# Patient Record
Sex: MALE | Race: WHITE | NOT HISPANIC OR LATINO | Employment: OTHER | URBAN - NONMETROPOLITAN AREA
[De-identification: names, ages, dates, MRNs, and addresses within clinical notes are randomized per-mention and may not be internally consistent; named-entity substitution may affect disease eponyms.]

---

## 2017-01-10 ENCOUNTER — OFFICE VISIT (OUTPATIENT)
Dept: OTOLARYNGOLOGY | Facility: CLINIC | Age: 77
End: 2017-01-10

## 2017-01-10 VITALS
BODY MASS INDEX: 34.67 KG/M2 | WEIGHT: 256 LBS | TEMPERATURE: 97.3 F | HEIGHT: 72 IN | SYSTOLIC BLOOD PRESSURE: 118 MMHG | DIASTOLIC BLOOD PRESSURE: 70 MMHG

## 2017-01-10 DIAGNOSIS — Z85.828 HISTORY OF BASAL CELL CARCINOMA: Primary | ICD-10-CM

## 2017-01-10 PROCEDURE — 99213 OFFICE O/P EST LOW 20 MIN: CPT | Performed by: OTOLARYNGOLOGY

## 2017-01-10 RX ORDER — BUDESONIDE 0.25 MG/2ML
0.25 INHALANT ORAL
COMMUNITY

## 2017-01-10 NOTE — MR AVS SNAPSHOT
Chepe Acevedo   1/10/2017 9:00 AM   Office Visit    Dept Phone:  [phone]   Encounter #:  [id number]    Provider:  Ashok Harden MD   Department:  [facility]                Your Full Care Plan              Today's Medication Changes          These changes are accurate as of: 1/10/17  9:30 AM.  If you have any questions, ask your nurse or doctor.               Medication(s)that have changed:     indapamide 2.5 MG tablet   Commonly known as:  LOZOL   Take 2.5 mg by mouth daily.   What changed:  Another medication with the same name was removed. Continue taking this medication, and follow the directions you see here.   Changed by:  Ashok Harden MD         Stop taking medication(s)listed here:     FLOVENT HFA IN   Stopped by:  Ashok Harden MD           HYDROcodone-acetaminophen 5-325 MG per tablet   Commonly known as:  NORCO   Stopped by:  Ashok Harden MD           Hydrocortisone Butyrate 0.1 % solution   Stopped by:  Ashok Harden MD           Unable to find   Stopped by:  Ashok Harden MD                      Your Updated Medication List          This list is accurate as of: 1/10/17  9:30 AM.  Always use your most recent med list.                ASPIRIN LOW DOSE 81 MG tablet   Generic drug:  aspirin       AZELASTINE HCL NA       budesonide 0.25 MG/2ML nebulizer solution   Commonly known as:  PULMICORT       CINNAMON PO       CO Q-10 PO       indapamide 2.5 MG tablet   Commonly known as:  LOZOL       JANUVIA 100 MG tablet   Generic drug:  SITagliptin       lisinopril 10 MG tablet   Commonly known as:  PRINIVIL,ZESTRIL       metFORMIN 1000 MG tablet   Commonly known as:  GLUCOPHAGE       montelukast 10 MG tablet   Commonly known as:  SINGULAIR       MUCINEX PO       OCUVITE ADULT 50+ PO       OMEGA 3 PO       propranolol 80 MG tablet   Commonly known as:  INDERAL       RESVERATROL PO       SAW PALMETTO PO       simvastatin 40 MG tablet  "  Commonly known as:  ZOCOR       Turmeric 500 MG capsule       VITAMIN D3 PO               Instructions     None    Patient Instructions History      Upcoming Appointments     Visit Type Date Time Department    FOLLOW UP 1/10/2017  9:00 AM [facility]      MyChart Signup     Our records indicate that you have an active [employer] account.    You can view your After Visit Summary by going to [email or web address] and logging in with your [facility] username and password.  If you don't have a [facility] username and password but a parent or guardian has access to your record, the parent or guardian should login with their own [facility] username and password and access your record to view the After Visit Summary.    If you have questions, you can email [email or web address] or call [phone] to talk to our [facility] staff.  Remember, [facility] is NOT to be used for urgent needs.  For medical emergencies, dial 911.               Other Info from Your Visit           Allergies     Chlor-trimeton [Chlorpheniramine] Allergy Other (See Comments)    HYPER, AND EXTREME AGITATION    Latex Allergy Rash    ITCHING, REDNESS      Reason for Visit     Follow-up           Vital Signs     Blood Pressure Temperature Height Weight Body Mass Index Smoking Status    118/70 97.3 °F (36.3 °C) 72\" (182.9 cm) 256 lb (116 kg) 34.72 kg/m2 Never Smoker        "

## 2017-01-10 NOTE — PROGRESS NOTES
YOB: 1940  Location: [facility]  Location Address: [address] [place] [address], [facility], [address] [place] [address]  Location Phone: [phone]    Chief Complaint   Patient presents with   • Follow-up       History of Present Illness  Chepe Acevedo is a 76 y.o. male.  Chepe Acevedo is here for follow up of ENT complaints.  Patient is status post excision of BCCa of left ear and left temple on 16. The pathology results show clear margins. Patient has no complaints today and denies new skin lesions.    Tissue Exam   Order: [id number]   Status:  Final result   Visible to patient:  Yes (MyChart) Dx:  Basal cell carcinoma; Essential hyper...      3mo ago     Clinical Information       Pre-Op Diagnosis: Basal cell carcinoma.       Final Diagnosis   1. Skin, left temple, excision:  Basal cell carcinoma, nodular pattern  Margins of resection are negative for basal cell carcinoma     2. Skin, left ear, excision:  Basal cell carcinoma, superficial spreading and infiltrated patterns  Margins of resection are negative                   Past Medical History   Diagnosis Date   • Arthritis    • Arthritis    • Asthma    • Basal cell carcinoma of skin of face    • Benign hypertrophy of prostate    • Cataract    • COPD (chronic obstructive pulmonary disease)    • COPD (chronic obstructive pulmonary disease)    • Diabetes    • Diabetes mellitus      TYPE 2   • Hypertension    • Hypertension    • Parkinson's disease    • Postural dizziness    • Skin cancer of face    • Sleep apnea in adult        Past Surgical History   Procedure Laterality Date   • Cholecystectomy     • Basal cell carcinoma excision  2014   • Other surgical history       Shoulder   • Sinus surgery     • Other surgical history       Thumb Surgery   • Trigger finger release       LEFT THUMB   • Total shoulder arthroplasty Right    • Cataract extraction w/ intraocular lens  implant, bilateral Bilateral    • Head/neck lesion/cyst excision Left 2016      Procedure: EXCISION LESION left ear and left temple BASAL CELL W/ F/S;  Surgeon: Ashok Harden MD;  Location:  [facility] OR;  Service:          Current Outpatient Prescriptions:   •  aspirin (ASPIRIN LOW DOSE) 81 MG tablet, Take 81 mg by mouth daily. Delayed Release (DR/EC), Disp: , Rfl:   •  AZELASTINE HCL NA, into each nostril. Azelastine, Disp: , Rfl:   •  budesonide (PULMICORT) 0.25 MG/2ML nebulizer solution, Take 0.25 mg by nebulization Daily., Disp: , Rfl:   •  Cholecalciferol (VITAMIN D3 PO), Take 5,000 Int'l Units by mouth daily., Disp: , Rfl:   •  CINNAMON PO, Take 1,000 mg by mouth daily., Disp: , Rfl:   •  Coenzyme Q10 (CO Q-10 PO), Take 400 mg by mouth daily., Disp: , Rfl:   •  GuaiFENesin (MUCINEX PO), Take 600 mg by mouth daily., Disp: , Rfl:   •  indapamide (LOZOL) 2.5 MG tablet, Take 2.5 mg by mouth daily., Disp: , Rfl:   •  JANUVIA 100 MG tablet, Take 100 mg by mouth daily., Disp: , Rfl:   •  lisinopril (PRINIVIL,ZESTRIL) 10 MG tablet, Take 10 mg by mouth daily., Disp: , Rfl:   •  metFORMIN (GLUCOPHAGE) 1000 MG tablet, Take 1,000 mg by mouth 2 (two) times a day with meals., Disp: , Rfl:   •  montelukast (SINGULAIR) 10 MG tablet, Take 10 mg by mouth. Take 1 tablet (10 mg) by oral route once daily in the evening, Disp: , Rfl:   •  Multiple Vitamins-Minerals (OCUVITE ADULT 50+ PO), Take 1 tablet by mouth Daily., Disp: , Rfl:   •  Omega-3 Fatty Acids (OMEGA 3 PO), Take  by mouth. Omega 3 350-400 mg oral capsule, Disp: , Rfl:   •  propranolol (INDERAL) 80 MG tablet, Take 80 mg by mouth 2 (two) times a day., Disp: , Rfl:   •  RESVERATROL PO, Take 100 mg by mouth daily., Disp: , Rfl:   •  Saw Palmetto, Serenoa repens, (SAW PALMETTO PO), Take 450 mg by mouth., Disp: , Rfl:   •  simvastatin (ZOCOR) 40 MG tablet, Take 40 mg by mouth., Disp: , Rfl:   •  Turmeric 500 MG capsule, Take 500 mg by mouth daily., Disp: , Rfl:     Chlor-trimeton [chlorpheniramine] and Latex    Family History   Problem Relation Age  of Onset   • Heart disease Father    • Arthritis Mother    • Cancer Sister        Social History     Social History   • Marital status:      Spouse name: N/A   • Number of children: N/A   • Years of education: N/A     Occupational History   • Not on file.     Social History Main Topics   • Smoking status: Never Smoker   • Smokeless tobacco: Never Used   • Alcohol use Not on file   • Drug use: No   • Sexual activity: Not on file     Other Topics Concern   • Not on file     Social History Narrative       Review of Systems   All other systems reviewed and are negative.      Vitals:    01/10/17 0855   BP: 118/70   Temp: 97.3 °F (36.3 °C)       Objective     Physical Exam  CONSTITUTIONAL: well nourished, alert, oriented, in no acute distress     COMMUNICATION AND VOICE: able to communicate normally, normal voice quality    HEAD: normocephalic, no lesions, atraumatic, no tenderness, no masses     FACE: appearance normal, no lesions, no tenderness, no deformities, facial motion symmetric-incision of the left temple are well-healed    SALIVARY GLANDS: parotid glands with no tenderness, no swelling, no masses, submandibular glands with normal size, nontender    EYES: ocular motility normal, eyelids normal, orbits normal, no proptosis, conjunctiva normal , pupils equal, round     EARS:  Hearing: response to conversational voice normal bilaterally   External Ears: auricles without lesions-incision the left ear well-healed  Otoscopic: tympanic membrane appearance normal, no lesions, no perforation, normal mobility, no fluid    NOSE:  External Nose: structure normal, no tenderness on palpation, no nasal discharge, no lesions, no evidence of trauma, nostrils patent   Intranasal Exam: nasal mucosa minimal crusting, vestibule within normal limits, inferior turbinate mild bilateral hypertrophy, nasal septum midline   Nasopharynx:     ORAL:  Lips: upper and lower lips without lesion   Teeth: dentition within normal limits for  age   Gums: gingivae healthy   Oral Mucosa: oral mucosa normal, no mucosal lesions   Floor of Mouth: Warthin’s duct patent, mucosa normal  Tongue: lingual mucosa normal without lesions, normal tongue mobility   Palate: soft and hard palates with normal mucosa and structure  Oropharynx: oropharyngeal mucosa normal    HYPOPHARYNX:   LARYNX: epiglottis and arytenoid cartilage within normal limits, vocal cord mucosa normal with normal mobility     NECK: neck appearance normal, no mass,  noted without erythema or tenderness    THYROID: no overt thyromegaly, no tenderness, nodules or mass present on palpation, position midline     LYMPH NODES: no lymphadenopathy    CHEST/RESPIRATORY: respiratory effort normal, normal breath sounds     CARDIOVASCULAR: rate and rhythm normal, extremities without cyanosis or edema      NEUROLOGIC/PSYCHIATRIC: oriented to time, place and person, mood normal, affect appropriate, CN II-XII intact grossly    Assessment/Plan   Chepe was seen today for follow-up.    Diagnoses and all orders for this visit:    History of basal cell carcinoma  Comments:  left temple and ear      * Surgery not found *  No orders of the defined types were placed in this encounter.    Return if symptoms worsen or fail to improve.       Patient Instructions   Call for problems  Keep Dermatology follow up  Otherwise follow up here when necessary

## 2019-03-20 ENCOUNTER — TELEPHONE (OUTPATIENT)
Dept: CARDIOLOGY | Age: 79
End: 2019-03-20

## 2019-07-03 ENCOUNTER — OFFICE VISIT (OUTPATIENT)
Dept: CARDIOLOGY | Age: 79
End: 2019-07-03
Payer: MEDICARE

## 2019-07-03 VITALS
HEART RATE: 65 BPM | BODY MASS INDEX: 35.62 KG/M2 | SYSTOLIC BLOOD PRESSURE: 112 MMHG | DIASTOLIC BLOOD PRESSURE: 70 MMHG | WEIGHT: 263 LBS | HEIGHT: 72 IN

## 2019-07-03 DIAGNOSIS — J82.81: ICD-10-CM

## 2019-07-03 DIAGNOSIS — R06.09 DOE (DYSPNEA ON EXERTION): ICD-10-CM

## 2019-07-03 DIAGNOSIS — G20 PARKINSON'S DISEASE (HCC): ICD-10-CM

## 2019-07-03 DIAGNOSIS — E78.5 DYSLIPIDEMIA: ICD-10-CM

## 2019-07-03 DIAGNOSIS — I25.10 CORONARY ARTERY DISEASE INVOLVING NATIVE CORONARY ARTERY OF NATIVE HEART WITHOUT ANGINA PECTORIS: Primary | ICD-10-CM

## 2019-07-03 DIAGNOSIS — I10 ESSENTIAL HYPERTENSION: ICD-10-CM

## 2019-07-03 PROBLEM — G20.A1 PARKINSON'S DISEASE: Status: ACTIVE | Noted: 2019-07-03

## 2019-07-03 PROBLEM — E11.9 TYPE 2 DIABETES MELLITUS (HCC): Status: ACTIVE | Noted: 2019-07-03

## 2019-07-03 PROCEDURE — G8598 ASA/ANTIPLAT THER USED: HCPCS | Performed by: INTERNAL MEDICINE

## 2019-07-03 PROCEDURE — 1123F ACP DISCUSS/DSCN MKR DOCD: CPT | Performed by: INTERNAL MEDICINE

## 2019-07-03 PROCEDURE — 4040F PNEUMOC VAC/ADMIN/RCVD: CPT | Performed by: INTERNAL MEDICINE

## 2019-07-03 PROCEDURE — 93000 ELECTROCARDIOGRAM COMPLETE: CPT | Performed by: INTERNAL MEDICINE

## 2019-07-03 PROCEDURE — 1036F TOBACCO NON-USER: CPT | Performed by: INTERNAL MEDICINE

## 2019-07-03 PROCEDURE — 99212 OFFICE O/P EST SF 10 MIN: CPT | Performed by: INTERNAL MEDICINE

## 2019-07-03 PROCEDURE — G8417 CALC BMI ABV UP PARAM F/U: HCPCS | Performed by: INTERNAL MEDICINE

## 2019-07-03 PROCEDURE — G8427 DOCREV CUR MEDS BY ELIG CLIN: HCPCS | Performed by: INTERNAL MEDICINE

## 2019-07-03 RX ORDER — CYANOCOBALAMIN (VITAMIN B-12) 1000 MCG
1 TABLET, EXTENDED RELEASE ORAL 2 TIMES DAILY WITH MEALS
COMMUNITY
End: 2021-08-31

## 2019-07-03 RX ORDER — MAG HYDROX/ALUMINUM HYD/SIMETH 400-400-40
SUSPENSION, ORAL (FINAL DOSE FORM) ORAL DAILY
COMMUNITY

## 2019-07-03 RX ORDER — ACETAMINOPHEN 160 MG
TABLET,DISINTEGRATING ORAL DAILY
COMMUNITY
End: 2022-01-04

## 2019-07-03 RX ORDER — AMPICILLIN TRIHYDRATE 250 MG
CAPSULE ORAL DAILY
COMMUNITY
End: 2022-01-04

## 2019-07-03 RX ORDER — MONTELUKAST SODIUM 10 MG/1
10 TABLET ORAL NIGHTLY
COMMUNITY

## 2019-07-03 RX ORDER — PERPHENAZINE/AMITRIPTYLINE HCL 4MG-10MG
TABLET ORAL DAILY
COMMUNITY
End: 2022-01-04

## 2019-07-03 RX ORDER — NEOMYCIN/POLYMYXIN B/PRAMOXINE 3.5-10K-1
CREAM (GRAM) TOPICAL DAILY
COMMUNITY
End: 2022-01-04

## 2019-07-03 RX ORDER — PROPRANOLOL HYDROCHLORIDE 80 MG/1
80 TABLET ORAL 2 TIMES DAILY
COMMUNITY

## 2019-07-03 RX ORDER — LISINOPRIL 10 MG/1
10 TABLET ORAL DAILY
COMMUNITY
End: 2020-08-25 | Stop reason: ALTCHOICE

## 2019-07-03 RX ORDER — DIPHENHYDRAMINE HYDROCHLORIDE 25 MG/1
TABLET ORAL DAILY
COMMUNITY

## 2019-07-03 RX ORDER — SIMVASTATIN 40 MG
40 TABLET ORAL NIGHTLY
COMMUNITY

## 2019-07-03 RX ORDER — INDAPAMIDE 2.5 MG/1
2.5 TABLET, FILM COATED ORAL EVERY MORNING
COMMUNITY
End: 2022-01-04

## 2019-07-18 ENCOUNTER — TELEPHONE (OUTPATIENT)
Dept: CARDIOLOGY | Age: 79
End: 2019-07-18

## 2019-07-25 ENCOUNTER — HOSPITAL ENCOUNTER (OUTPATIENT)
Dept: NON INVASIVE DIAGNOSTICS | Age: 79
Discharge: HOME | End: 2019-07-25
Payer: MEDICARE

## 2019-07-25 DIAGNOSIS — I25.10 CORONARY ARTERY DISEASE INVOLVING NATIVE CORONARY ARTERY OF NATIVE HEART WITHOUT ANGINA PECTORIS: ICD-10-CM

## 2019-07-25 DIAGNOSIS — R06.09 DOE (DYSPNEA ON EXERTION): ICD-10-CM

## 2019-07-25 LAB
LV EF: 50 %
LVEF MODALITY: NORMAL

## 2019-07-25 PROCEDURE — C8928 TTE W OR W/O FOL W/CON,STRES: HCPCS

## 2019-07-25 PROCEDURE — 6360000004 HC RX CONTRAST MEDICATION: Performed by: INTERNAL MEDICINE

## 2019-07-25 RX ADMIN — PERFLUTREN 2.2 MG: 6.52 INJECTION, SUSPENSION INTRAVENOUS at 11:11

## 2019-08-01 ENCOUNTER — TELEPHONE (OUTPATIENT)
Dept: CARDIOLOGY | Age: 79
End: 2019-08-01

## 2020-01-29 ENCOUNTER — TELEPHONE (OUTPATIENT)
Dept: CARDIOLOGY | Age: 80
End: 2020-01-29

## 2020-02-24 ENCOUNTER — TELEPHONE (OUTPATIENT)
Dept: CARDIOLOGY | Age: 80
End: 2020-02-24

## 2020-02-24 NOTE — TELEPHONE ENCOUNTER
Left message for Patient called to confirm the location of upcoming appointment with Dr Hudson Mo on 02/24/2020 at  10:00 and to arrive 15min early. Please note appointment will be on the 4th floor of the [facility] in [place] [address] which is located in the building with the floating water ball directly attached to [facility]. This is not the building that is located up the street from [facility].  If unable to make appt please call us at [phone].

## 2020-02-25 ENCOUNTER — OFFICE VISIT (OUTPATIENT)
Dept: CARDIOLOGY | Age: 80
End: 2020-02-25
Payer: MEDICARE

## 2020-02-25 VITALS
BODY MASS INDEX: 34.13 KG/M2 | HEART RATE: 56 BPM | DIASTOLIC BLOOD PRESSURE: 66 MMHG | SYSTOLIC BLOOD PRESSURE: 104 MMHG | HEIGHT: 72 IN | WEIGHT: 252 LBS

## 2020-02-25 PROCEDURE — G8484 FLU IMMUNIZE NO ADMIN: HCPCS | Performed by: INTERNAL MEDICINE

## 2020-02-25 PROCEDURE — G8417 CALC BMI ABV UP PARAM F/U: HCPCS | Performed by: INTERNAL MEDICINE

## 2020-02-25 PROCEDURE — 1123F ACP DISCUSS/DSCN MKR DOCD: CPT | Performed by: INTERNAL MEDICINE

## 2020-02-25 PROCEDURE — 93000 ELECTROCARDIOGRAM COMPLETE: CPT | Performed by: INTERNAL MEDICINE

## 2020-02-25 PROCEDURE — 4040F PNEUMOC VAC/ADMIN/RCVD: CPT | Performed by: INTERNAL MEDICINE

## 2020-02-25 PROCEDURE — 99213 OFFICE O/P EST LOW 20 MIN: CPT | Performed by: INTERNAL MEDICINE

## 2020-02-25 PROCEDURE — G8427 DOCREV CUR MEDS BY ELIG CLIN: HCPCS | Performed by: INTERNAL MEDICINE

## 2020-02-25 PROCEDURE — 1036F TOBACCO NON-USER: CPT | Performed by: INTERNAL MEDICINE

## 2020-02-25 RX ORDER — PRIMIDONE 50 MG/1
50 TABLET ORAL NIGHTLY
COMMUNITY

## 2020-02-25 RX ORDER — BUDESONIDE 0.5 MG/2ML
1 INHALANT ORAL 2 TIMES DAILY
COMMUNITY

## 2020-08-25 ENCOUNTER — OFFICE VISIT (OUTPATIENT)
Dept: CARDIOLOGY | Age: 80
End: 2020-08-25
Payer: MEDICARE

## 2020-08-25 VITALS
HEIGHT: 72 IN | SYSTOLIC BLOOD PRESSURE: 112 MMHG | HEART RATE: 51 BPM | WEIGHT: 230 LBS | DIASTOLIC BLOOD PRESSURE: 68 MMHG | BODY MASS INDEX: 31.15 KG/M2

## 2020-08-25 PROCEDURE — 1036F TOBACCO NON-USER: CPT | Performed by: INTERNAL MEDICINE

## 2020-08-25 PROCEDURE — 4040F PNEUMOC VAC/ADMIN/RCVD: CPT | Performed by: INTERNAL MEDICINE

## 2020-08-25 PROCEDURE — 1123F ACP DISCUSS/DSCN MKR DOCD: CPT | Performed by: INTERNAL MEDICINE

## 2020-08-25 PROCEDURE — G8417 CALC BMI ABV UP PARAM F/U: HCPCS | Performed by: INTERNAL MEDICINE

## 2020-08-25 PROCEDURE — 93000 ELECTROCARDIOGRAM COMPLETE: CPT | Performed by: INTERNAL MEDICINE

## 2020-08-25 PROCEDURE — 99212 OFFICE O/P EST SF 10 MIN: CPT | Performed by: INTERNAL MEDICINE

## 2020-08-25 PROCEDURE — G8427 DOCREV CUR MEDS BY ELIG CLIN: HCPCS | Performed by: INTERNAL MEDICINE

## 2020-08-25 NOTE — PROGRESS NOTES
19-year-old gentleman with a history of dyslipidemia, diabetes, hypertension, Parkinson's disease, and coronary disease returns for follow-up. Previous angiogram in July 2005 revealed a 20% stenosis of his proximal LAD with his last follow-up assessment taken place in July 2019 at which time 7 minutes stress echo revealed no evidence clinically, electrocardiographically, or echocardiographically of ischemia. His recent echo however did show some mild left ventricular dilatation left atrial enlargement with preserved systolic function. Clinically he is doing well, he exercises on a regular basis riding his bike 5 to 6 miles a day without change in tolerance, unusual dyspnea, or chest discomfort. His lipids have been controlled with most recent LDL from July 83. He and his wife have been very careful with her social distancing. On exam he carries 230 pounds on a 6 foot frame. Pressure is 112/68 with pulse of 51. Mildly endomorphic elderly gentleman in no distress. EOMs full, sclerae and conjunctiva normal. PERRLA. Mask in place. Trachea midline with no neck masses. Assessment of internal jugular veins reveals no elevation of central venous pressure at 45 degrees. Carotid pulses normal without delay or bruit. Thyroid normal to palpation. Chest exam reveals normal respiratory effort, no abnormal breath sounds and normal expiratory phase. No skin lesions seen. PMI normal.  Heart sounds are distant with a soft 1/6 systolic murmur. normal bowel sounds without palpable mass or bruit. No clubbing or acrocyanosis. No significant lower extremity edema or signs of venous insufficiency. General motor strength appears to be within normal limits. Normal range of motion with normal gait. Alert, oriented x 3, memory and cognition normal as reflected by history and conversation. Assessment/plan:  1. Hypertension -well-controlled  2. Dyslipidemia -well-controlled on present therapy  3.   Coronary disease -negative

## 2021-02-26 ENCOUNTER — OFFICE VISIT (OUTPATIENT)
Dept: CARDIOLOGY CLINIC | Age: 81
End: 2021-02-26
Payer: MEDICARE

## 2021-02-26 VITALS
HEART RATE: 53 BPM | DIASTOLIC BLOOD PRESSURE: 84 MMHG | WEIGHT: 234 LBS | SYSTOLIC BLOOD PRESSURE: 120 MMHG | HEIGHT: 72 IN | BODY MASS INDEX: 31.69 KG/M2

## 2021-02-26 DIAGNOSIS — R06.09 DOE (DYSPNEA ON EXERTION): ICD-10-CM

## 2021-02-26 DIAGNOSIS — I25.10 CORONARY ARTERY DISEASE INVOLVING NATIVE CORONARY ARTERY OF NATIVE HEART WITHOUT ANGINA PECTORIS: Primary | ICD-10-CM

## 2021-02-26 PROCEDURE — 93000 ELECTROCARDIOGRAM COMPLETE: CPT | Performed by: INTERNAL MEDICINE

## 2021-02-26 PROCEDURE — 4040F PNEUMOC VAC/ADMIN/RCVD: CPT | Performed by: INTERNAL MEDICINE

## 2021-02-26 PROCEDURE — 99212 OFFICE O/P EST SF 10 MIN: CPT | Performed by: INTERNAL MEDICINE

## 2021-02-26 PROCEDURE — G8427 DOCREV CUR MEDS BY ELIG CLIN: HCPCS | Performed by: INTERNAL MEDICINE

## 2021-02-26 PROCEDURE — 1036F TOBACCO NON-USER: CPT | Performed by: INTERNAL MEDICINE

## 2021-02-26 PROCEDURE — G8484 FLU IMMUNIZE NO ADMIN: HCPCS | Performed by: INTERNAL MEDICINE

## 2021-02-26 PROCEDURE — G8417 CALC BMI ABV UP PARAM F/U: HCPCS | Performed by: INTERNAL MEDICINE

## 2021-02-26 PROCEDURE — 1123F ACP DISCUSS/DSCN MKR DOCD: CPT | Performed by: INTERNAL MEDICINE

## 2021-02-26 NOTE — PATIENT INSTRUCTIONS
[facility] at the [facility] and [address] [facility] located on the first floor of [place] [address] through hospital main entrance and turn immediately to your left. Patient's contact number:  [phone] (home)     Date/Time: 08/02/21 @ 8:15  Dobutamine Stress Test    A chemical stress test uses chemical agents injected into the body through the vein. These chemicals make the heart function as if it were under stress. A chemical stress test is used when a traditional stress test (called a cardiac stress test) cannot be done. Testing will take approximately one hour. Dobutamine Stress Echocardiogram Instructions:   No caffeine 24 hours prior to the testing. This includes: coffee, pop/soda, chocolate, cold medications, etc.  Any product that might contain caffeine. Do not eat or drink anything, except water, eight (8) hours before the test.   No alcohol or nicotine twelve (12) hours prior to your test.   Wear comfortable clothing. If you are taking metoprolol, stop morning of this procedure. If you need to change this appointment, please call outpatient scheduling at [phone].

## 2021-02-26 NOTE — PROGRESS NOTES
HISTORY  79year-old gentleman with a history of dyslipidemia, diabetes, hypertension, Parkinson's disease, and coronary disease returns for follow-up. Angiographic assessment in July 2005 revealed 20% stenosis of proximal LAD. Last stress testing performed in July 2019 without evidence of ischemia. He has had ongoing address above his hypertension and his lipids with good control, last lipid results from July 2020 with an LDL of 76, HDL 43, triglycerides 136. He rides his bike on a daily basis and return today relates no chest discomfort suspicious for ischemia. He is reluctant to take the vaccine because of concerns there was not enough testing. PHYSICAL EXAM  On exam carries 234 pounds on a six-foot frame with a pressure 120/84 to pulse of 53. EOMs full, sclerae and conjunctiva normal. PERRLA. Mask in place. Trachea midline with no neck masses. Assessment of internal jugular veins reveals no elevation of central venous pressure at 45 degrees. Carotid pulses normal without delay or bruit. Thyroid normal to palpation. Chest exam reveals normal respiratory effort, no abnormal breath sounds and normal expiratory phase. No skin lesions seen. PMI normal. S1, S2 normal without murmur or marlen or click. Mild abdominal obesity. Normal bowel sounds without palpable mass or bruit. No clubbing or acrocyanosis. No significant lower extremity edema or signs of venous insufficiency. General motor strength appears to be within normal limits. Normal range of motion with normal gait. Alert, oriented x 3, memory and cognition normal as reflected by history and conversation. EKG reveals sinus bradycardia at 53 with a first-degree AV block [232 ms]. ASSESSMENT/PLAN:   1. Coronary disease - 15 years out from a cath demonstrating early proximal LAD obstruction. We'll obtain a dobutamine stress echo in 6 months  2.   Hypertension and dyslipidemia - well controlled 3.  Pandemic response - benefits of vaccine discussed at some length

## 2021-06-22 ENCOUNTER — TRANSCRIBE ORDERS (OUTPATIENT)
Dept: ADMINISTRATIVE | Facility: HOSPITAL | Age: 81
End: 2021-06-22

## 2021-06-22 DIAGNOSIS — Z11.59 SCREENING FOR VIRAL DISEASE: Primary | ICD-10-CM

## 2021-06-23 ENCOUNTER — APPOINTMENT (OUTPATIENT)
Dept: PREADMISSION TESTING | Facility: HOSPITAL | Age: 81
End: 2021-06-23

## 2021-06-25 ENCOUNTER — APPOINTMENT (OUTPATIENT)
Dept: LAB | Facility: HOSPITAL | Age: 81
End: 2021-06-25

## 2021-06-25 ENCOUNTER — PRE-ADMISSION TESTING (OUTPATIENT)
Dept: PREADMISSION TESTING | Facility: HOSPITAL | Age: 81
End: 2021-06-25

## 2021-06-25 ENCOUNTER — LAB (OUTPATIENT)
Dept: LAB | Facility: HOSPITAL | Age: 81
End: 2021-06-25

## 2021-06-25 VITALS
WEIGHT: 238.32 LBS | OXYGEN SATURATION: 99 % | BODY MASS INDEX: 33.36 KG/M2 | SYSTOLIC BLOOD PRESSURE: 133 MMHG | RESPIRATION RATE: 18 BRPM | HEIGHT: 71 IN | DIASTOLIC BLOOD PRESSURE: 76 MMHG | HEART RATE: 58 BPM

## 2021-06-25 LAB
ALBUMIN SERPL-MCNC: 4.2 G/DL (ref 3.5–5.2)
ALBUMIN/GLOB SERPL: 1.2 G/DL
ALP SERPL-CCNC: 79 U/L (ref 39–117)
ALT SERPL W P-5'-P-CCNC: 19 U/L (ref 1–41)
ANION GAP SERPL CALCULATED.3IONS-SCNC: 12 MMOL/L (ref 5–15)
AST SERPL-CCNC: 22 U/L (ref 1–40)
BASOPHILS # BLD AUTO: 0.04 10*3/MM3 (ref 0–0.2)
BASOPHILS NFR BLD AUTO: 0.5 % (ref 0–1.5)
BILIRUB SERPL-MCNC: 0.3 MG/DL (ref 0–1.2)
BUN SERPL-MCNC: 26 MG/DL (ref 8–23)
BUN/CREAT SERPL: 36.6 (ref 7–25)
CALCIUM SPEC-SCNC: 9.7 MG/DL (ref 8.6–10.5)
CHLORIDE SERPL-SCNC: 101 MMOL/L (ref 98–107)
CO2 SERPL-SCNC: 27 MMOL/L (ref 22–29)
CREAT SERPL-MCNC: 0.71 MG/DL (ref 0.76–1.27)
DEPRECATED RDW RBC AUTO: 51.1 FL (ref 37–54)
EOSINOPHIL # BLD AUTO: 0.18 10*3/MM3 (ref 0–0.4)
EOSINOPHIL NFR BLD AUTO: 2.4 % (ref 0.3–6.2)
ERYTHROCYTE [DISTWIDTH] IN BLOOD BY AUTOMATED COUNT: 15.3 % (ref 12.3–15.4)
GFR SERPL CREATININE-BSD FRML MDRD: 106 ML/MIN/1.73
GLOBULIN UR ELPH-MCNC: 3.5 GM/DL
GLUCOSE SERPL-MCNC: 121 MG/DL (ref 65–99)
HCT VFR BLD AUTO: 43.3 % (ref 37.5–51)
HGB BLD-MCNC: 14.6 G/DL (ref 13–17.7)
IMM GRANULOCYTES # BLD AUTO: 0.04 10*3/MM3 (ref 0–0.05)
IMM GRANULOCYTES NFR BLD AUTO: 0.5 % (ref 0–0.5)
LYMPHOCYTES # BLD AUTO: 1.78 10*3/MM3 (ref 0.7–3.1)
LYMPHOCYTES NFR BLD AUTO: 23.7 % (ref 19.6–45.3)
MCH RBC QN AUTO: 30.7 PG (ref 26.6–33)
MCHC RBC AUTO-ENTMCNC: 33.7 G/DL (ref 31.5–35.7)
MCV RBC AUTO: 91 FL (ref 79–97)
MONOCYTES # BLD AUTO: 1.08 10*3/MM3 (ref 0.1–0.9)
MONOCYTES NFR BLD AUTO: 14.4 % (ref 5–12)
NEUTROPHILS NFR BLD AUTO: 4.39 10*3/MM3 (ref 1.7–7)
NEUTROPHILS NFR BLD AUTO: 58.5 % (ref 42.7–76)
NRBC BLD AUTO-RTO: 0 /100 WBC (ref 0–0.2)
PLATELET # BLD AUTO: 161 10*3/MM3 (ref 140–450)
PMV BLD AUTO: 11.7 FL (ref 6–12)
POTASSIUM SERPL-SCNC: 3.7 MMOL/L (ref 3.5–5.2)
PROT SERPL-MCNC: 7.7 G/DL (ref 6–8.5)
RBC # BLD AUTO: 4.76 10*6/MM3 (ref 4.14–5.8)
SARS-COV-2 ORF1AB RESP QL NAA+PROBE: NOT DETECTED
SODIUM SERPL-SCNC: 140 MMOL/L (ref 136–145)
WBC # BLD AUTO: 7.51 10*3/MM3 (ref 3.4–10.8)

## 2021-06-25 PROCEDURE — 85025 COMPLETE CBC W/AUTO DIFF WBC: CPT

## 2021-06-25 PROCEDURE — 36415 COLL VENOUS BLD VENIPUNCTURE: CPT

## 2021-06-25 PROCEDURE — C9803 HOPD COVID-19 SPEC COLLECT: HCPCS | Performed by: SPECIALIST

## 2021-06-25 PROCEDURE — 93010 ELECTROCARDIOGRAM REPORT: CPT | Performed by: INTERNAL MEDICINE

## 2021-06-25 PROCEDURE — 93005 ELECTROCARDIOGRAM TRACING: CPT

## 2021-06-25 PROCEDURE — U0004 COV-19 TEST NON-CDC HGH THRU: HCPCS | Performed by: SPECIALIST

## 2021-06-25 PROCEDURE — 80053 COMPREHEN METABOLIC PANEL: CPT

## 2021-06-25 PROCEDURE — U0005 INFEC AGEN DETEC AMPLI PROBE: HCPCS | Performed by: SPECIALIST

## 2021-06-25 RX ORDER — PRIMIDONE 50 MG/1
50 TABLET ORAL DAILY
COMMUNITY

## 2021-06-25 RX ORDER — CITALOPRAM 10 MG/1
10 TABLET ORAL DAILY
COMMUNITY

## 2021-06-25 NOTE — DISCHARGE INSTRUCTIONS
DAY OF SURGERY INSTRUCTIONS        YOUR SURGEON: Dr Marisol Conner    PROCEDURE: Wide excision skin lesion left shoulder    DATE OF SURGERY: June 28    ARRIVAL TIME: AS DIRECTED BY OFFICE    TAKE  PROPRANOLOL THE MORNING OF SURGERY WITH A SIP OF WATER      ALL OTHER HOME MEDICATION CHECK WITH YOUR PHYSICIAN      DO NOT TAKE ANY ERECTILE DYSFUNCTION MEDICATIONS (EX: CIALIS, VIAGRA) 24 HOURS PRIOR TO SURGERY                      MANAGING PAIN AFTER SURGERY    We know you are probably wondering what your pain will be like after surgery.  Following surgery it is unrealistic to expect you will not have pain.   Pain is how our bodies let us know that something is wrong or cautions us to be careful.  That said, our goal is to make your pain tolerable.    Methods we may use to treat your pain include (oral or IV medications, PCAs, epidurals, nerve blocks, etc.)   While some procedures require IV pain medications for a short time after surgery, transitioning to pain medications by mouth allows for better management of pain.   Your nurse will encourage you to take oral pain medications whenever possible.  IV medications work almost immediately, but only last a short while.  Taking medications by mouth allows for a more constant level of medication in your blood stream for a longer period of time.      Once your pain is out of control it is harder to get back under control.  It is important you are aware when your next dose of pain medication is due.  If you are admitted, your nurse may write the time of your next dose on the white board in your room to help you remember.      We are interested in your pain and encourage you to inform us about aggravating factors during your visit.   Many times a simple repositioning every few hours can make a big difference.    If your physician says it is okay, do not let your pain prevent you from getting out of bed. Be sure to call your nurse for assistance prior to getting up so you do  not fall.      Before surgery, please decide your tolerable pain goal.  These faces help describe the pain ratings we use on a 0-10 scale.   Be prepared to tell us your goal and whether or not you take pain or anxiety medications at home.          BEFORE YOU COME TO THE HOSPITAL  (Pre-op instructions)  • Do not eat, drink, smoke or chew gum after midnight the night before surgery.  This also includes no mints.  • Morning of surgery take only the medicines you have been instructed with a sip of water unless otherwise instructed  by your physician.  • Do not shave, wear makeup or dark nail polish.  • Remove all jewelry including rings.  • Leave anything you consider valuable at home.  • Leave your suitcase in the car until after your surgery.  • Bring the following with you if applicable:  o Picture ID and insurance, Medicare or Medicaid cards  o Co-pay/deductible required by insurance (cash, check, credit card)  o Copy of advance directive, living will or power-of- documents if not brought to PAT  o CPAP or BIPAP mask and tubing  o Relaxation aids ( book, magazine), etc.  o Hearing aids                        ON THE DAY OF SURGERY  · On the day of surgery check in at registration located at the main entrance of the hospital.   ? You will be registered and given a beeper with instructions where to wait in the main lobby.  ? When your beeper lights up and vibrates a member of the Outpatient Surgery staff will meet you at the double doors under the stair steps and escort you to your preoperative room.   · You may have cloth compression devices placed on your legs. These help to prevent blood clots and reduce swelling in your legs.  · An IV may be inserted into one of your veins.  · In the operating room, you may be given one or more of the following:  ? A medicine to help you relax (sedative).  ? A medicine to numb the area (local anesthetic).  ? A medicine to make you fall asleep (general anesthetic).  ? A  "medicine that is injected into an area of your body to numb everything below the injection site (regional anesthetic).  · Your surgical site will be marked or identified.  · You may be given an antibiotic through your IV to help prevent infection.  Contact a health care provider if you:  · Develop a fever of more than 100.4°F (38°C) or other feelings of illness during the 48 hours before your surgery.  · Have symptoms that get worse.  Have questions or concerns about your surgery    General Anesthesia/Surgery, Adult  General anesthesia is the use of medicines to make a person \"go to sleep\" (unconscious) for a medical procedure. General anesthesia must be used for certain procedures, and is often recommended for procedures that:  · Last a long time.  · Require you to be still or in an unusual position.  · Are major and can cause blood loss.  The medicines used for general anesthesia are called general anesthetics. As well as making you unconscious for a certain amount of time, these medicines:  · Prevent pain.  · Control your blood pressure.  · Relax your muscles.  Tell a health care provider about:  · Any allergies you have.  · All medicines you are taking, including vitamins, herbs, eye drops, creams, and over-the-counter medicines.  · Any problems you or family members have had with anesthetic medicines.  · Types of anesthetics you have had in the past.  · Any blood disorders you have.  · Any surgeries you have had.  · Any medical conditions you have.  · Any recent upper respiratory, chest, or ear infections.  · Any history of:  ? Heart or lung conditions, such as heart failure, sleep apnea, asthma, or chronic obstructive pulmonary disease (COPD).  ?  service.  ? Depression or anxiety.  · Any tobacco or drug use, including marijuana or alcohol use.  · Whether you are pregnant or may be pregnant.  What are the risks?  Generally, this is a safe procedure. However, problems may occur, including:  · Allergic " reaction.  · Lung and heart problems.  · Inhaling food or liquid from the stomach into the lungs (aspiration).  · Nerve injury.  · Air in the bloodstream, which can lead to stroke.  · Extreme agitation or confusion (delirium) when you wake up from the anesthetic.  · Waking up during your procedure and being unable to move. This is rare.  These problems are more likely to develop if you are having a major surgery or if you have an advanced or serious medical condition. You can prevent some of these complications by answering all of your health care provider's questions thoroughly and by following all instructions before your procedure.  General anesthesia can cause side effects, including:  · Nausea or vomiting.  · A sore throat from the breathing tube.  · Hoarseness.  · Wheezing or coughing.  · Shaking chills.  · Tiredness.  · Body aches.  · Anxiety.  · Sleepiness or drowsiness.  · Confusion or agitation.  RISKS AND COMPLICATIONS OF SURGERY  Your health care provider will discuss possible risks and complications with you before surgery. Common risks and complications include:    · Problems due to the use of anesthetics.  · Blood loss and replacement (does not apply to minor surgical procedures).  · Temporary increase in pain due to surgery.  · Uncorrected pain or problems that the surgery was meant to correct.  · Infection.  · New damage.    What happens before the procedure?    Medicines  Ask your health care provider about:  · Changing or stopping your regular medicines. This is especially important if you are taking diabetes medicines or blood thinners.  · Taking medicines such as aspirin and ibuprofen. These medicines can thin your blood. Do not take these medicines unless your health care provider tells you to take them.  · Taking over-the-counter medicines, vitamins, herbs, and supplements. Do not take these during the week before your procedure unless your health care provider approves them.  General  instructions  · Starting 3-6 weeks before the procedure, do not use any products that contain nicotine or tobacco, such as cigarettes and e-cigarettes. If you need help quitting, ask your health care provider.  · If you brush your teeth on the morning of the procedure, make sure to spit out all of the toothpaste.  · Tell your health care provider if you become ill or develop a cold, cough, or fever.  · If instructed by your health care provider, bring your sleep apnea device with you on the day of your surgery (if applicable).  · Ask your health care provider if you will be going home the same day, the following day, or after a longer hospital stay.  ? Plan to have someone take you home from the hospital or clinic.  ? Plan to have a responsible adult care for you for at least 24 hours after you leave the hospital or clinic. This is important.  What happens during the procedure?  · You will be given anesthetics through both of the following:  ? A mask placed over your nose and mouth.  ? An IV in one of your veins.  · You may receive a medicine to help you relax (sedative).  · After you are unconscious, a breathing tube may be inserted down your throat to help you breathe. This will be removed before you wake up.  · An anesthesia specialist will stay with you throughout your procedure. He or she will:  ? Keep you comfortable and safe by continuing to give you medicines and adjusting the amount of medicine that you get.  ? Monitor your blood pressure, pulse, and oxygen levels to make sure that the anesthetics do not cause any problems.  The procedure may vary among health care providers and hospitals.  What happens after the procedure?  · Your blood pressure, temperature, heart rate, breathing rate, and blood oxygen level will be monitored until the medicines you were given have worn off.  · You will wake up in a recovery area. You may wake up slowly.  · If you feel anxious or agitated, you may be given medicine to  help you calm down.  · If you will be going home the same day, your health care provider may check to make sure you can walk, drink, and urinate.  · Your health care provider will treat any pain or side effects you have before you go home.  · Do not drive for 24 hours if you were given a sedative.  Summary  · General anesthesia is used to keep you still and prevent pain during a procedure.  · It is important to tell your healthcare provider about your medical history and any surgeries you have had, and previous experience with anesthesia.  · Follow your healthcare provider’s instructions about when to stop eating, drinking, or taking certain medicines before your procedure.  · Plan to have someone take you home from the hospital or clinic.  This information is not intended to replace advice given to you by your health care provider. Make sure you discuss any questions you have with your health care provider.  Document Released: 03/26/2009 Document Revised: 08/03/2018 Document Reviewed: 08/03/2018  [employer] Interactive Patient Education © 2019 [employer]       Fall Prevention in Hospitals, Adult  As a hospital patient, your condition and the treatments you receive can increase your risk for falls. Some additional risk factors for falls in a hospital include:  · Being in an unfamiliar environment.  · Being on bed rest.  · Your surgery.  · Taking certain medicines.  · Your tubing requirements, such as intravenous (IV) therapy or catheters.  It is important that you learn how to decrease fall risks while at the hospital. Below are important tips that can help prevent falls.  SAFETY TIPS FOR PREVENTING FALLS  Talk about your risk of falling.  · Ask your health care provider why you are at risk for falling. Is it your medicine, illness, tubing placement, or something else?  · Make a plan with your health care provider to keep you safe from falls.  · Ask your health care provider or pharmacist about side effects of your  medicines. Some medicines can make you dizzy or affect your coordination.  Ask for help.  · Ask for help before getting out of bed. You may need to press your call button.  · Ask for assistance in getting safely to the toilet.  · Ask for a walker or cane to be put at your bedside. Ask that most of the side rails on your bed be placed up before your health care provider leaves the room.  · Ask family or friends to sit with you.  · Ask for things that are out of your reach, such as your glasses, hearing aids, telephone, bedside table, or call button.  Follow these tips to avoid falling:  · Stay lying or seated, rather than standing, while waiting for help.  · Wear rubber-soled slippers or shoes whenever you walk in the hospital.  · Avoid quick, sudden movements.  ¨ Change positions slowly.  ¨ Sit on the side of your bed before standing.  ¨ Stand up slowly and wait before you start to walk.  · Let your health care provider know if there is a spill on the floor.  · Pay careful attention to the medical equipment, electrical cords, and tubes around you.  · When you need help, use your call button by your bed or in the bathroom. Wait for one of your health care providers to help you.  · If you feel dizzy or unsure of your footing, return to bed and wait for assistance.  · Avoid being distracted by the TV, telephone, or another person in your room.  · Do not lean or support yourself on rolling objects, such as IV poles or bedside tables.     This information is not intended to replace advice given to you by your health care provider. Make sure you discuss any questions you have with your health care provider.     Document Released: 12/15/2001 Document Revised: 01/08/2016 Document Reviewed: 08/25/2013  [employer] Interactive Patient Education ©2016 [employer]       [facility]  CHG 4% Patient Instruction Sheet    Chlorhexidine Before Surgery  Chlorhexidine gluconate (CHG) is a germ-killing (antiseptic) solution  that is used to clean the skin. It gets rid of the bacteria that normally live on the skin. Cleaning your skin with CHG before surgery helps lower the risk for infection after surgery.    How to use CHG solution  · You will take 2 showers, one shower the night before surgery, the second shower the morning of surgery before coming to the hospital.  · Use CHG only as told by your health care provider, and follow the instructions on the label.  · Use CHG solution while taking a shower. Follow these steps when using CHG solution (unless your health care provider gives you different instructions):  1. Start the shower.  2. Use your normal soap and shampoo to wash your face and hair.  3. Turn off the shower or move out of the shower stream.  4. Pour the CHG onto a clean washcloth. Do not use any type of brush or rough-edged sponge.  5. Starting at your neck, lather your body down to your toes. Make sure you:  6. Pay special attention to the part of your body where you will be having surgery. Scrub this area for at least 1 minute.  7. Use the full amount of CHG as directed. Usually, this is one half bottle for each shower.  8. Do not use CHG on your head or face. If the solution gets into your ears or eyes, rinse them well with water.  9. Avoid your genital area.  10. Avoid any areas of skin that have broken skin, cuts, or scrapes.  11. Scrub your back and under your arms. Make sure to wash skin folds.  12. Let the lather sit on your skin for 1-2 minutes or as long as told by your health care  provider.  13. Thoroughly rinse your entire body in the shower. Make sure that all body creases and crevices are rinsed well.  14. Dry off with a clean towel. Do not put any substances on your body afterward, such as powder, lotion, or perfume.  15. Put on clean clothes or pajamas.  16. If it is the night before your surgery, sleep in clean sheets.    What are the risks?  Risks of using CHG include:  · A skin reaction.  · Hearing  loss, if CHG gets in your ears.  · Eye injury, if CHG gets in your eyes and is not rinsed out.  · The CHG product catching fire.  Make sure that you avoid smoking and flames after applying CHG to your skin.  Do not use CHG:  · If you have a chlorhexidine allergy or have previously reacted to chlorhexidine.  · On babies younger than 2 months of age.      On the day of surgery, when you are taken to your room in Outpatient Surgery you will be given a CHG prepackaged cloth to wipe the site for your surgery.  How to use CHG prepackaged cloths  · Follow the instructions on the label.  · Use the CHG cloth on clean, dry skin. Follow these steps when using a CHG cloth (unless your health care provider gives you different instructions):  1. Using the CHG cloth, vigorously scrub the part of your body where you will be having surgery. Scrub using a back-and-forth motion for 3 minutes. The area on your body should be completely wet with CHG when you are finished scrubbing.  2. Do not rinse. Discard the cloth and let the area air-dry for 1 minute. Do not put any substances on your body afterward, such as powder, lotion, or perfume.  Contact a health care provider if:  · Your skin gets irritated after scrubbing.  · You have questions about using your solution or cloth.  Get help right away if:  · Your eyes become very red or swollen.  · Your eyes itch badly.  · Your skin itches badly and is red or swollen.  · Your hearing changes.  · You have trouble seeing.  · You have swelling or tingling in your mouth or throat.  · You have trouble breathing.  · You swallow any chlorhexidine.  Summary  · Chlorhexidine gluconate (CHG) is a germ-killing (antiseptic) solution that is used to clean the skin. Cleaning your skin with CHG before surgery helps lower the risk for infection after surgery.  · You may be given CHG to use at home. It may be in a bottle or in a prepackaged cloth to use on your skin. Carefully follow your health care  provider's instructions and the instructions on the product label.  · Do not use CHG if you have a chlorhexidine allergy.  · Contact your health care provider if your skin gets irritated after scrubbing.  This information is not intended to replace advice given to you by your health care provider. Make sure you discuss any questions you have with your health care provider.  Document Released: 09/11/2013 Document Revised: 11/15/2018 Document Reviewed: 11/15/2018  [employer] Interactive Patient Education © 2019 [employer]          PATIENT/FAMILY/RESPONSIBLE PARTY VERBALIZES UNDERSTANDING OF ABOVE EDUCATION.  COPY OF PAIN SCALE GIVEN AND REVIEWED WITH VERBALIZED UNDERSTANDING.

## 2021-06-26 LAB
QT INTERVAL: 430 MS
QTC INTERVAL: 418 MS

## 2021-06-28 ENCOUNTER — ANESTHESIA EVENT (OUTPATIENT)
Dept: PERIOP | Facility: HOSPITAL | Age: 81
End: 2021-06-28

## 2021-06-28 ENCOUNTER — ANESTHESIA (OUTPATIENT)
Dept: PERIOP | Facility: HOSPITAL | Age: 81
End: 2021-06-28

## 2021-06-28 ENCOUNTER — HOSPITAL ENCOUNTER (OUTPATIENT)
Facility: HOSPITAL | Age: 81
Setting detail: HOSPITAL OUTPATIENT SURGERY
Discharge: HOME | End: 2021-06-28
Attending: SPECIALIST | Admitting: SPECIALIST

## 2021-06-28 VITALS
SYSTOLIC BLOOD PRESSURE: 105 MMHG | OXYGEN SATURATION: 94 % | HEART RATE: 53 BPM | RESPIRATION RATE: 18 BRPM | DIASTOLIC BLOOD PRESSURE: 60 MMHG | TEMPERATURE: 97.2 F

## 2021-06-28 DIAGNOSIS — C44.91 BASAL CELL CARCINOMA: ICD-10-CM

## 2021-06-28 DIAGNOSIS — L98.9 SKIN LESION: ICD-10-CM

## 2021-06-28 LAB
GLUCOSE BLDC GLUCOMTR-MCNC: 114 MG/DL (ref 70–130)
GLUCOSE BLDC GLUCOMTR-MCNC: 140 MG/DL (ref 70–130)

## 2021-06-28 PROCEDURE — 25010000002 ONDANSETRON PER 1 MG: Performed by: NURSE ANESTHETIST, CERTIFIED REGISTERED

## 2021-06-28 PROCEDURE — 25010000002 PROPOFOL 10 MG/ML EMULSION: Performed by: NURSE ANESTHETIST, CERTIFIED REGISTERED

## 2021-06-28 PROCEDURE — 88305 TISSUE EXAM BY PATHOLOGIST: CPT | Performed by: SPECIALIST

## 2021-06-28 PROCEDURE — 25010000002 SUCCINYLCHOLINE PER 20 MG: Performed by: NURSE ANESTHETIST, CERTIFIED REGISTERED

## 2021-06-28 PROCEDURE — 25010000002 DEXAMETHASONE PER 1 MG: Performed by: NURSE ANESTHETIST, CERTIFIED REGISTERED

## 2021-06-28 PROCEDURE — 82962 GLUCOSE BLOOD TEST: CPT

## 2021-06-28 RX ORDER — SODIUM CHLORIDE 0.9 % (FLUSH) 0.9 %
3 SYRINGE (ML) INJECTION EVERY 12 HOURS SCHEDULED
Status: DISCONTINUED | OUTPATIENT
Start: 2021-06-28 | End: 2021-06-28 | Stop reason: HOSPADM

## 2021-06-28 RX ORDER — LIDOCAINE HYDROCHLORIDE 10 MG/ML
0.5 INJECTION, SOLUTION EPIDURAL; INFILTRATION; INTRACAUDAL; PERINEURAL ONCE AS NEEDED
Status: DISCONTINUED | OUTPATIENT
Start: 2021-06-28 | End: 2021-06-28

## 2021-06-28 RX ORDER — DEXAMETHASONE SODIUM PHOSPHATE 4 MG/ML
INJECTION, SOLUTION INTRA-ARTICULAR; INTRALESIONAL; INTRAMUSCULAR; INTRAVENOUS; SOFT TISSUE AS NEEDED
Status: DISCONTINUED | OUTPATIENT
Start: 2021-06-28 | End: 2021-06-28 | Stop reason: SURG

## 2021-06-28 RX ORDER — NALOXONE HCL 0.4 MG/ML
0.4 VIAL (ML) INJECTION AS NEEDED
Status: DISCONTINUED | OUTPATIENT
Start: 2021-06-28 | End: 2021-06-28 | Stop reason: HOSPADM

## 2021-06-28 RX ORDER — LIDOCAINE HYDROCHLORIDE 10 MG/ML
0.5 INJECTION, SOLUTION EPIDURAL; INFILTRATION; INTRACAUDAL; PERINEURAL ONCE AS NEEDED
Status: DISCONTINUED | OUTPATIENT
Start: 2021-06-28 | End: 2021-06-28 | Stop reason: HOSPADM

## 2021-06-28 RX ORDER — LABETALOL HYDROCHLORIDE 5 MG/ML
5 INJECTION, SOLUTION INTRAVENOUS
Status: DISCONTINUED | OUTPATIENT
Start: 2021-06-28 | End: 2021-06-28 | Stop reason: HOSPADM

## 2021-06-28 RX ORDER — SODIUM CHLORIDE 0.9 % (FLUSH) 0.9 %
3-10 SYRINGE (ML) INJECTION AS NEEDED
Status: DISCONTINUED | OUTPATIENT
Start: 2021-06-28 | End: 2021-06-28 | Stop reason: HOSPADM

## 2021-06-28 RX ORDER — PROPOFOL 10 MG/ML
VIAL (ML) INTRAVENOUS AS NEEDED
Status: DISCONTINUED | OUTPATIENT
Start: 2021-06-28 | End: 2021-06-28 | Stop reason: SURG

## 2021-06-28 RX ORDER — MAGNESIUM HYDROXIDE 1200 MG/15ML
LIQUID ORAL AS NEEDED
Status: DISCONTINUED | OUTPATIENT
Start: 2021-06-28 | End: 2021-06-28 | Stop reason: HOSPADM

## 2021-06-28 RX ORDER — SUCCINYLCHOLINE CHLORIDE 20 MG/ML
INJECTION INTRAMUSCULAR; INTRAVENOUS AS NEEDED
Status: DISCONTINUED | OUTPATIENT
Start: 2021-06-28 | End: 2021-06-28 | Stop reason: SURG

## 2021-06-28 RX ORDER — SODIUM CHLORIDE, SODIUM LACTATE, POTASSIUM CHLORIDE, CALCIUM CHLORIDE 600; 310; 30; 20 MG/100ML; MG/100ML; MG/100ML; MG/100ML
100 INJECTION, SOLUTION INTRAVENOUS CONTINUOUS
Status: DISCONTINUED | OUTPATIENT
Start: 2021-06-28 | End: 2021-06-28 | Stop reason: HOSPADM

## 2021-06-28 RX ORDER — LIDOCAINE HYDROCHLORIDE 20 MG/ML
INJECTION, SOLUTION EPIDURAL; INFILTRATION; INTRACAUDAL; PERINEURAL AS NEEDED
Status: DISCONTINUED | OUTPATIENT
Start: 2021-06-28 | End: 2021-06-28 | Stop reason: SURG

## 2021-06-28 RX ORDER — OXYCODONE AND ACETAMINOPHEN 7.5; 325 MG/1; MG/1
2 TABLET ORAL EVERY 4 HOURS PRN
Status: DISCONTINUED | OUTPATIENT
Start: 2021-06-28 | End: 2021-06-28 | Stop reason: HOSPADM

## 2021-06-28 RX ORDER — OXYCODONE AND ACETAMINOPHEN 10; 325 MG/1; MG/1
1 TABLET ORAL ONCE AS NEEDED
Status: COMPLETED | OUTPATIENT
Start: 2021-06-28 | End: 2021-06-28

## 2021-06-28 RX ORDER — FLUMAZENIL 0.1 MG/ML
0.2 INJECTION INTRAVENOUS AS NEEDED
Status: DISCONTINUED | OUTPATIENT
Start: 2021-06-28 | End: 2021-06-28 | Stop reason: HOSPADM

## 2021-06-28 RX ORDER — SODIUM CHLORIDE, SODIUM LACTATE, POTASSIUM CHLORIDE, CALCIUM CHLORIDE 600; 310; 30; 20 MG/100ML; MG/100ML; MG/100ML; MG/100ML
1000 INJECTION, SOLUTION INTRAVENOUS CONTINUOUS
Status: DISCONTINUED | OUTPATIENT
Start: 2021-06-28 | End: 2021-06-28 | Stop reason: HOSPADM

## 2021-06-28 RX ORDER — FENTANYL CITRATE 50 UG/ML
25 INJECTION, SOLUTION INTRAMUSCULAR; INTRAVENOUS
Status: DISCONTINUED | OUTPATIENT
Start: 2021-06-28 | End: 2021-06-28 | Stop reason: HOSPADM

## 2021-06-28 RX ORDER — IBUPROFEN 600 MG/1
600 TABLET ORAL ONCE AS NEEDED
Status: DISCONTINUED | OUTPATIENT
Start: 2021-06-28 | End: 2021-06-28 | Stop reason: HOSPADM

## 2021-06-28 RX ORDER — HYDROCODONE BITARTRATE AND ACETAMINOPHEN 7.5; 325 MG/1; MG/1
1 TABLET ORAL EVERY 4 HOURS PRN
Qty: 12 TABLET | Refills: 0 | Status: SHIPPED | OUTPATIENT
Start: 2021-06-28 | End: 2021-12-03

## 2021-06-28 RX ORDER — SODIUM CHLORIDE 0.9 % (FLUSH) 0.9 %
3 SYRINGE (ML) INJECTION AS NEEDED
Status: DISCONTINUED | OUTPATIENT
Start: 2021-06-28 | End: 2021-06-28 | Stop reason: HOSPADM

## 2021-06-28 RX ORDER — ROCURONIUM BROMIDE 10 MG/ML
INJECTION, SOLUTION INTRAVENOUS AS NEEDED
Status: DISCONTINUED | OUTPATIENT
Start: 2021-06-28 | End: 2021-06-28 | Stop reason: SURG

## 2021-06-28 RX ORDER — ONDANSETRON 2 MG/ML
4 INJECTION INTRAMUSCULAR; INTRAVENOUS ONCE AS NEEDED
Status: DISCONTINUED | OUTPATIENT
Start: 2021-06-28 | End: 2021-06-28 | Stop reason: HOSPADM

## 2021-06-28 RX ORDER — ONDANSETRON 2 MG/ML
INJECTION INTRAMUSCULAR; INTRAVENOUS AS NEEDED
Status: DISCONTINUED | OUTPATIENT
Start: 2021-06-28 | End: 2021-06-28 | Stop reason: SURG

## 2021-06-28 RX ORDER — BUPIVACAINE HYDROCHLORIDE AND EPINEPHRINE 5; 5 MG/ML; UG/ML
INJECTION, SOLUTION PERINEURAL AS NEEDED
Status: DISCONTINUED | OUTPATIENT
Start: 2021-06-28 | End: 2021-06-28 | Stop reason: HOSPADM

## 2021-06-28 RX ADMIN — DEXAMETHASONE SODIUM PHOSPHATE 4 MG: 4 INJECTION, SOLUTION INTRA-ARTICULAR; INTRALESIONAL; INTRAMUSCULAR; INTRAVENOUS; SOFT TISSUE at 07:19

## 2021-06-28 RX ADMIN — SODIUM CHLORIDE, POTASSIUM CHLORIDE, SODIUM LACTATE AND CALCIUM CHLORIDE 1000 ML: 600; 310; 30; 20 INJECTION, SOLUTION INTRAVENOUS at 06:16

## 2021-06-28 RX ADMIN — LIDOCAINE HYDROCHLORIDE 100 MG: 20 INJECTION, SOLUTION EPIDURAL; INFILTRATION; INTRACAUDAL; PERINEURAL at 07:10

## 2021-06-28 RX ADMIN — Medication 20 MCG: at 07:14

## 2021-06-28 RX ADMIN — PROPOFOL 100 MG: 10 INJECTION, EMULSION INTRAVENOUS at 07:10

## 2021-06-28 RX ADMIN — SUCCINYLCHOLINE CHLORIDE 140 MG: 20 INJECTION, SOLUTION INTRAMUSCULAR; INTRAVENOUS at 07:10

## 2021-06-28 RX ADMIN — ONDANSETRON 4 MG: 2 INJECTION INTRAMUSCULAR; INTRAVENOUS at 07:19

## 2021-06-28 RX ADMIN — OXYCODONE AND ACETAMINOPHEN 1 TABLET: 325; 10 TABLET ORAL at 08:03

## 2021-06-28 RX ADMIN — CEFAZOLIN 1 G: 1 INJECTION, POWDER, FOR SOLUTION INTRAMUSCULAR; INTRAVENOUS; PARENTERAL at 07:10

## 2021-06-28 RX ADMIN — ROCURONIUM BROMIDE 5 MG: 50 INJECTION INTRAVENOUS at 07:10

## 2021-06-28 NOTE — ANESTHESIA PROCEDURE NOTES
Airway  Urgency: elective    Date/Time: 6/28/2021 7:12 AM  Airway not difficult    General Information and Staff    Patient location during procedure: OR  CRNA: OVIDIO Suresh CRNA    Indications and Patient Condition  Indications for airway management: airway protection    Preoxygenated: yes  MILS maintained throughout  Mask difficulty assessment: 1 - vent by mask    Final Airway Details  Final airway type: endotracheal airway      Successful airway: ETT  Cuffed: yes   Successful intubation technique: direct laryngoscopy  Facilitating devices/methods: intubating stylet  Endotracheal tube insertion site: oral  Blade: Kelly  Blade size: 3  ETT size (mm): 7.5  Cormack-Lehane Classification: grade I - full view of glottis  Placement verified by: chest auscultation and capnometry   Cuff volume (mL): 5  Measured from: lips  ETT/EBT  to lips (cm): 22  Number of attempts at approach: 1  Assessment: lips, teeth, and gum same as pre-op and atraumatic intubation

## 2021-06-28 NOTE — ANESTHESIA POSTPROCEDURE EVALUATION
Patient: Chepe Acevedo    Procedure Summary     Date: 06/28/21 Room / Location:  [facility] OR  /  [facility] OR    Anesthesia Start: 0707 Anesthesia Stop: 0749    Procedure: WIDE EXCISION SKIN LESION LEFT SHOULDER (Left ) Diagnosis: (LEFT SHOULDER SKIN LESION)    Surgeons: Marisol Conner MD Provider: OVIDIO Suresh CRNA    Anesthesia Type: general ASA Status: 2          Anesthesia Type: general    Vitals  Vitals Value Taken Time   /69 06/28/21 0810   Temp 97.2 °F (36.2 °C) 06/28/21 0810   Pulse 58 06/28/21 0810   Resp 16 06/28/21 0810   SpO2 95 % 06/28/21 0810           Post Anesthesia Care and Evaluation    Patient location during evaluation: PACU  Patient participation: complete - patient participated  Level of consciousness: awake and alert  Pain management: adequate  Airway patency: patent  Anesthetic complications: No anesthetic complications    Cardiovascular status: acceptable  Respiratory status: acceptable  Hydration status: acceptable    Comments: Blood pressure 120/69, pulse 58, temperature 97.2 °F (36.2 °C), resp. rate 16, SpO2 95 %.    Pt discharged from PACU based on bharathi score >8

## 2021-06-28 NOTE — ANESTHESIA PREPROCEDURE EVALUATION
" Anesthesia Evaluation     Patient summary reviewed   no history of anesthetic complications:  NPO Solid Status: > 6 hours  NPO Liquid Status: > 6 hours           Airway   Mallampati: II  TM distance: >3 FB  Neck ROM: full  no difficulty expected  Dental - normal exam     Pulmonary    (+) COPD, sleep apnea on CPAP,   (-) no home oxygen  Cardiovascular   Exercise tolerance: good (4-7 METS)    ECG reviewed    (+) hypertension well controlled, hyperlipidemia,   (-) pacemaker, dysrhythmias, cardiac stents, CABG      Neuro/Psych  (+) neuromuscular disease (\"pre\" Parkinson's disease ), dizziness/light headedness,     GI/Hepatic/Renal/Endo    (+)   diabetes mellitus type 2,   (-) liver disease, no renal disease    Musculoskeletal     Abdominal    Substance History      OB/GYN          Other   arthritis,    history of cancer                      Anesthesia Plan    ASA 2     general   (POCD discussed; )  intravenous induction     Anesthetic plan, all risks, benefits, and alternatives have been provided, discussed and informed consent has been obtained with: patient.      "

## 2021-07-03 LAB
CYTO UR: NORMAL
LAB AP CASE REPORT: NORMAL
LAB AP DIAGNOSIS COMMENT: NORMAL
PATH REPORT.FINAL DX SPEC: NORMAL
PATH REPORT.GROSS SPEC: NORMAL

## 2021-08-02 ENCOUNTER — TELEPHONE (OUTPATIENT)
Dept: CARDIOLOGY CLINIC | Age: 81
End: 2021-08-02

## 2021-08-02 NOTE — TELEPHONE ENCOUNTER
Patients wife left message that she needs to reschedule patients stress test. Returned her call to give her number to scheduling but phone was cutting out and there was silence for about 1 minute. Disconnected call and called back and got voicemail. Left number to scheduling on voicemail.

## 2021-08-18 ENCOUNTER — HOSPITAL ENCOUNTER (OUTPATIENT)
Dept: NON INVASIVE DIAGNOSTICS | Age: 81
Discharge: HOME | End: 2021-08-18
Payer: MEDICARE

## 2021-08-18 VITALS
BODY MASS INDEX: 31.74 KG/M2 | DIASTOLIC BLOOD PRESSURE: 89 MMHG | SYSTOLIC BLOOD PRESSURE: 147 MMHG | WEIGHT: 234 LBS | HEART RATE: 83 BPM

## 2021-08-18 DIAGNOSIS — R06.09 DOE (DYSPNEA ON EXERTION): ICD-10-CM

## 2021-08-18 DIAGNOSIS — I25.10 CORONARY ARTERY DISEASE INVOLVING NATIVE CORONARY ARTERY OF NATIVE HEART WITHOUT ANGINA PECTORIS: ICD-10-CM

## 2021-08-18 PROCEDURE — 6360000002 HC RX W HCPCS: Performed by: INTERNAL MEDICINE

## 2021-08-18 PROCEDURE — 6360000004 HC RX CONTRAST MEDICATION: Performed by: INTERNAL MEDICINE

## 2021-08-18 PROCEDURE — C8928 TTE W OR W/O FOL W/CON,STRES: HCPCS

## 2021-08-18 PROCEDURE — 2580000003 HC RX 258: Performed by: INTERNAL MEDICINE

## 2021-08-18 PROCEDURE — 2500000003 HC RX 250 WO HCPCS: Performed by: INTERNAL MEDICINE

## 2021-08-18 RX ORDER — DOBUTAMINE HYDROCHLORIDE 200 MG/100ML
2.5-4 INJECTION INTRAVENOUS CONTINUOUS PRN
Status: DISCONTINUED | OUTPATIENT
Start: 2021-08-18 | End: 2021-08-19 | Stop reason: HOSPADM

## 2021-08-18 RX ORDER — SODIUM CHLORIDE 9 MG/ML
INJECTION, SOLUTION INTRAVENOUS
Status: COMPLETED | OUTPATIENT
Start: 2021-08-18 | End: 2021-08-18

## 2021-08-18 RX ORDER — METOPROLOL TARTRATE 5 MG/5ML
5 INJECTION INTRAVENOUS PRN
Status: DISCONTINUED | OUTPATIENT
Start: 2021-08-18 | End: 2021-08-19 | Stop reason: HOSPADM

## 2021-08-18 RX ORDER — ATROPINE SULFATE 0.1 MG/ML
1 INJECTION INTRAVENOUS PRN
Status: DISCONTINUED | OUTPATIENT
Start: 2021-08-18 | End: 2021-08-19 | Stop reason: HOSPADM

## 2021-08-18 RX ADMIN — ATROPINE SULFATE 0.25 MG: 0.1 INJECTION, SOLUTION INTRAVENOUS at 10:30

## 2021-08-18 RX ADMIN — DOBUTAMINE IN DEXTROSE 10 MCG/KG/MIN: 200 INJECTION, SOLUTION INTRAVENOUS at 10:20

## 2021-08-18 RX ADMIN — SODIUM CHLORIDE: 9 INJECTION, SOLUTION INTRAVENOUS at 10:20

## 2021-08-18 RX ADMIN — PERFLUTREN 1.65 MG: 6.52 INJECTION, SUSPENSION INTRAVENOUS at 10:07

## 2021-08-18 RX ADMIN — METOPROLOL TARTRATE 4 MG: 5 INJECTION, SOLUTION INTRAVENOUS at 10:32

## 2021-08-26 ENCOUNTER — TELEPHONE (OUTPATIENT)
Dept: CARDIOLOGY CLINIC | Age: 81
End: 2021-08-26

## 2021-08-27 NOTE — TELEPHONE ENCOUNTER
Called and spoke with patient's spouse, she had questions about stress test and the amount of blockage. I advised they could keep appointment on 8/31 to discuss with provider or we could go ahead and schedule it was up to them. She advised they would keep their appt on 8/31 and discuss further there before deciding.

## 2021-08-27 NOTE — TELEPHONE ENCOUNTER
Patient wife returned call to [place]. Wife wants to speak with provider about what is going on. Please return call.

## 2021-08-31 ENCOUNTER — TELEPHONE (OUTPATIENT)
Dept: CARDIOLOGY CLINIC | Age: 81
End: 2021-08-31

## 2021-08-31 ENCOUNTER — OFFICE VISIT (OUTPATIENT)
Dept: CARDIOLOGY CLINIC | Age: 81
End: 2021-08-31
Payer: MEDICARE

## 2021-08-31 VITALS
BODY MASS INDEX: 32.23 KG/M2 | HEART RATE: 68 BPM | WEIGHT: 238 LBS | SYSTOLIC BLOOD PRESSURE: 138 MMHG | OXYGEN SATURATION: 96 % | DIASTOLIC BLOOD PRESSURE: 68 MMHG | HEIGHT: 72 IN

## 2021-08-31 DIAGNOSIS — E78.5 DYSLIPIDEMIA: ICD-10-CM

## 2021-08-31 DIAGNOSIS — I10 ESSENTIAL HYPERTENSION: ICD-10-CM

## 2021-08-31 DIAGNOSIS — I25.10 CORONARY ARTERY DISEASE INVOLVING NATIVE CORONARY ARTERY OF NATIVE HEART WITHOUT ANGINA PECTORIS: Primary | ICD-10-CM

## 2021-08-31 PROCEDURE — 1036F TOBACCO NON-USER: CPT | Performed by: NURSE PRACTITIONER

## 2021-08-31 PROCEDURE — G8417 CALC BMI ABV UP PARAM F/U: HCPCS | Performed by: NURSE PRACTITIONER

## 2021-08-31 PROCEDURE — 1123F ACP DISCUSS/DSCN MKR DOCD: CPT | Performed by: NURSE PRACTITIONER

## 2021-08-31 PROCEDURE — 4040F PNEUMOC VAC/ADMIN/RCVD: CPT | Performed by: NURSE PRACTITIONER

## 2021-08-31 PROCEDURE — G8427 DOCREV CUR MEDS BY ELIG CLIN: HCPCS | Performed by: NURSE PRACTITIONER

## 2021-08-31 PROCEDURE — 99214 OFFICE O/P EST MOD 30 MIN: CPT | Performed by: NURSE PRACTITIONER

## 2021-08-31 NOTE — PROGRESS NOTES
[facility], [place]. [address], [facility], [address]  [phone] office  [phone] fax      OFFICE VISIT:  2021    Gini Kanner - : 1940  Reason For Visit:  Eros Cam is a 79 y.o. male who is here for 6 Month Follow-Up (6 month follow up and Dobutamine stress echo. Patient denies any cardiac symptoms. ) and Coronary Artery Disease    History:   Diagnosis Orders   1. Coronary artery disease involving native coronary artery of native heart without angina pectoris     2. Essential hypertension     3. Dyslipidemia       The patient presents today for cardiology follow up after Dr. Bee Sebastian ordered a DSE. The test showed electrocardiographic and echocardiographic evidence of myocardial ischemia with 1-2 mm horizontal ST segment depression and global hypokinesis. The patient was informed of test findings and reports \"I feel just fine. I had two false positive tests in the past.  I exercise everyday at least 35 minutes with no symptoms at all. The patient reports being a borderline diabetic. The patient denies symptoms to suggest myocardial ischemia, heart failure or arrhythmia. BP is well controlled on current regimen. The patient's PCP monitors cholesterol. Ottis Litten denies exertional chest pain, shortness of breath, orthopnea, paroxysmal nocturnal dyspnea, syncope, presyncope, sensed arrhythmia, edema and fatigue. The patient denies numbness or weakness to suggest cerebrovascular accident or transient ischemic attack.       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 Past Surgical History:   Procedure Laterality Date    CATARACT REMOVAL      CHOLECYSTECTOMY      FINGER TRIGGER RELEASE Left     thumb    SHOULDER ARTHROPLASTY Right     SINUS SURGERY      x5     No family history on file.   Social History     Tobacco Use    Smoking status: Never Smoker    Smokeless tobacco: Never Used   Substance Use Topics    Alcohol use: Not Currently      Current Outpatient Medications   Medication Sig Dispense Refill    budesonide (PULMICORT) 0.5 MG/2ML nebulizer suspension Take 1 ampule by nebulization 2 times daily      primidone (MYSOLINE) 50 MG tablet Take 50 mg by mouth nightly      metFORMIN (GLUCOPHAGE) 500 MG tablet Take 1,000 mg by mouth 2 times daily (with meals)       Apple Cider Vinegar 600 MG CAPS Take by mouth daily      dextromethorphan-guaiFENesin (MUCINEX DM)  MG per extended release tablet Take 1 tablet by mouth every 12 hours as needed      Cinnamon 500 MG CAPS Take by mouth daily      Turmeric Curcumin 500 MG CAPS Take by mouth daily      Coconut Oil 1000 MG CAPS Take by mouth daily      Omega-3 Krill Oil 500 MG CAPS Take by mouth daily      Green Tea 250 MG CAPS Take 500 mg by mouth daily      Resveratrol-Quercetin (RESVERATROL PLUS) 100-100 MG TABS Take by mouth daily      Coenzyme Q10 (COQ10) 400 MG CAPS Take by mouth daily      Cholecalciferol (VITAMIN D3) 2000 units CAPS Take by mouth daily      aspirin 81 MG tablet Take 81 mg by mouth daily      B Complex-C (SUPER B COMPLEX PO) Take by mouth daily      [place] 450 MG CAPS Take by mouth daily      Biotin 5 MG CAPS Take by mouth daily      simvastatin (ZOCOR) 40 MG tablet Take 40 mg by mouth nightly      indapamide (LOZOL) 2.5 MG tablet Take 2.5 mg by mouth every morning      propranolol (INDERAL) 80 MG tablet Take 80 mg by mouth 2 times daily      montelukast (SINGULAIR) 10 MG tablet Take 10 mg by mouth nightly      SITagliptin (JANUVIA) 100 MG tablet Take 100 mg by mouth daily       No current facility-administered medications for this visit. Allergies: Chlorethamine [ethylenediamine] and Silicone    Review of Systems  Constitutional  no appetite change, or unexpected weight change. No fever, chills or diaphoresis. No significant change in activity level or new onset of fatigue. HEENT  no significant rhinorrhea or epistaxis. No tinnitus or significant hearing loss. Eyes  no sudden vision change or amaurosis. No corneal arcus, xantholasma, subconjunctival hemorrhage or discharge. Respiratory  no significant wheezing, stridor, apnea or cough. No dyspnea on exertion or shortness of air. Cardiovascular  no exertional chest pain to suggest myocardial ischemia. No orthopnea or PND. No sensation of sustained arrythmia. No occurrence of slow heart rate. No palpitations. No claudication. Gastrointestinal  no abdominal swelling or pain. No blood in stool. No severe constipation, diarrhea, nausea, or vomiting. Genitourinary  no dysuria, frequency, or urgency. No flank pain or hematuria. Musculoskeletal  no back pain or myalgia. No problems with gait. Extremities - no clubbing, cyanosis or extremity edema. Skin  no color change or rash. No pallor. No new surgical incision. Neurologic  no speech difficulty, facial asymmetry or lateralizing weakness. No seizures, presyncope or syncope. No significant dizziness. Hematologic  no easy bruising or excessive bleeding. Psychiatric  no severe anxiety or insomnia. No confusion. All other review of systems are negative. Objective  Vital Signs - /68   Pulse 68   Ht 6' (1.829 m)   Wt 238 lb (108 kg)   SpO2 96%   BMI 32.28 kg/m²   General - Lino Zoila is alert, cooperative, and pleasant. Well groomed. No acute distress. Body habitus - Body mass index is 32.28 kg/m². HEENT  Head is normocephalic. No circumoral cyanosis.   Dentition is normal.  EYES -   Lids normal without ptosis. No discharge, edema or subconjunctival hemorrhage. Neck - Symmetrical without apparent mass or lymphadenopathy. Respiratory - Normal respiratory effort without use of accessory muscles. Ausculatation reveals vesicular breath sounds without crackles, wheezes, rub or rhonchi. Cardiovascular  No jugular venous distention. Auscultation reveals regular rate and rhythm. No audible clicks, gallop or rub. No murmur. No lower extremity varicosities. No carotid bruits. Abdominal -  No visible distention, mass or pulsations. Extremities - No clubbing or cyanosis. No statis dermatitis or ulcers. No edema. Musculoskeletal -   No Osler's nodes. No kyphosis or scoliosis. Gait is even and regular without limp or shuffle. Ambulates without assistance. Skin -  Warm and dry; no rash or pallor. No new surgical wound. Neurological - No focal neurological deficits. Thought processes coherent. No apparent tremor. Oriented to person, place and time. Psychiatric -  Appropriate affect and mood. Data reviewed:   8/18/21 DSE   Dobutamine stress echocardiogram with electrocardiographic and  echocardiographic evidence of myocardial ischemia. 1-2 mm horizontal ST   segment depression with dobutamine infusion and an appearance of global  hypokinesis concerning for possible global ischemia.     Recommendation    Signature   Electronically signed by Nate Medina DO(Interpreting   physician) on 08/18/2021 04:41 PM    Lab Results   Component Value Date     07/24/2014    K 3.7 07/24/2014    CL 98 07/24/2014    CO2 28 07/24/2014    BUN 16 07/24/2014    CREATININE 0.8 07/24/2014    GLUCOSE 166 07/24/2014    CALCIUM 9.8 07/24/2014    PROT 7.4 07/24/2014    LABALBU 4.1 07/24/2014    ALKPHOS 62 07/24/2014    AST 21 07/24/2014    ALT 26 07/24/2014    LABGLOM 100 07/24/2014     Lab Results   Component Value Date     07/24/2014    K 3.7 07/24/2014    CL 98 07/24/2014    CO2 28 07/24/2014 BUN 16 07/24/2014    CREATININE 0.8 07/24/2014    GLUCOSE 166 07/24/2014    CALCIUM 9.8 07/24/2014    PROT 7.4 07/24/2014    LABALBU 4.1 07/24/2014    ALKPHOS 62 07/24/2014    AST 21 07/24/2014    ALT 26 07/24/2014    LABGLOM 100 07/24/2014       BP Readings from Last 3 Encounters:   08/31/21 138/68   08/18/21 (!) 147/89   02/26/21 120/84    Pulse Readings from Last 3 Encounters:   08/31/21 68   08/18/21 83   02/26/21 53        Wt Readings from Last 3 Encounters:   08/31/21 238 lb (108 kg)   08/18/21 234 lb (106.1 kg)   02/26/21 234 lb (106.1 kg)     Assessment/Plan:   Diagnosis Orders   1. Coronary artery disease involving native coronary artery of native heart without angina pectoris     2. Essential hypertension     3. Dyslipidemia       Stable CV status without overt heart failure, sensed arrhythmia or angina. CAD - stable on current medical management. Exercise at least 35 minutes daily with no angina or dyspnea. Reviewed stress test results with patient. He does not want to proceed with any additional cardiac testing until I review further with Dr. Jose Manuel Morales. HTN -normotensive on current regimen. Hyperlipidemia - labs followed by PCP. Continue Zocor 40 mg daily. Patient is compliant with medication regimen. Previous cardiac history and records reviewed. Continue current medical management for cardiac related condition. Continue other current medications as directed. Continue to follow up with primary care provider for non cardiac medical problems. If your primary care provider is outside of the [facility] [place], please request that your labs be faxed to this office at [phone]. BP goal 130/80 or less. Call the office with any problems, questions or concerns at [phone]. Cardiology follow up as scheduled in [address] appointments. Educational included in patient instructions. Heart health.       Mayet Paul, JOVANNY

## 2021-08-31 NOTE — PATIENT INSTRUCTIONS
New instructions for today:      Patient Instructions:  Continue current medications as prescribed. Always keep a current medication list. Bring your medications to every office visit. Continue to follow up with primary care provider for non cardiac medical problems. Call the office with any problems, questions or concerns at [phone]. If you have been asked to keep a blood pressure log, do so for 2 weeks. Call the office to report readings to the triage nurse at [phone]. Follow up with cardiologist as scheduled. The following educational material has been included in this after visit summary for your review: Life simple 7. Heart health. Life simple 7  1) Manage blood pressure - high blood pressure is a major risk factor for heart disease and stroke. Keeping blood pressure in health range reduces strain on your heart, arteries and kidneys. Blood pressure goal is less than 130/80. 2) Control cholesterol - contributes to plaque, which can clog arteries and lead to heart disease and stroke. When you control your cholesterol you are giving your arteries their best chance to remain clear. It is recommended that you get cholesterol lab work done once a year. 3) Reduce blood sugar - most of the food we eat is turning into glucose or blood sugar that our body uses for energy. Over time, high levels of blood sugar can damage your heart, kidneys, eyes and nerves. 4) Get active - living an active life is one of the most rewarding gifts you can give yourself and those you love. Simply put, daily physical activity increases your length and quality of life. Strive to exercise 15 minutes most days of the week. 5)  Eat better - A healthy diet is one of your best weapons for fighting cardiovascular disease. When you eat a heart healthy diet, you improve your chances for feeling good and staying healthy for life.   6)  Lose weight - when you shed extra fat an unnecessary pounds, you reduce the burden on your hear, lungs, blood vessels and skeleton. You give yourself the gift of active living, you lower your blood pressure and help yourself feel better. 7) Stop smoking - cigarette smokers have a higher risk of developing cardiovascular disease. If  You smoke, quitting is the best thing you can do for your health. Check American Heart Association on line for more information on Life's Simple 7 and tips for healthy living. A Healthy Heart: Care Instructions  Your Care Instructions     Coronary artery disease, also called heart disease, occurs when a substance called plaque builds up in the vessels that supply oxygen-rich blood to your heart muscle. This can narrow the blood vessels and reduce blood flow. A heart attack happens when blood flow is completely blocked. A high-fat diet, smoking, and other factors increase the risk of heart disease. Your doctor has found that you have a chance of having heart disease. You can do lots of things to keep your heart healthy. It may not be easy, but you can change your diet, exercise more, and quit smoking. These steps really work to lower your chance of heart disease. Follow-up care is a key part of your treatment and safety. Be sure to make and go to all appointments, and call your doctor if you are having problems. It's also a good idea to know your test results and keep a list of the medicines you take. How can you care for yourself at home? Diet  · Use less salt when you cook and eat. This helps lower your blood pressure. Taste food before salting. Add only a little salt when you think you need it. With time, your taste buds will adjust to less salt. · Eat fewer snack items, fast foods, canned soups, and other high-salt, high-fat, processed foods. · Read food labels and try to avoid saturated and trans fats. They increase your risk of heart disease by raising cholesterol levels. · Limit the amount of solid fat-butter, margarine, and shortening-you eat.  Use olive, peanut, or canola oil when you cook. Bake, broil, and steam foods instead of frying them. · Eat a variety of fruit and vegetables every day. Dark green, deep orange, red, or yellow fruits and vegetables are especially good for you. Examples include spinach, carrots, peaches, and berries. · Foods high in fiber can reduce your cholesterol and provide important vitamins and minerals. High-fiber foods include whole-grain cereals and breads, oatmeal, beans, brown rice, citrus fruits, and apples. · Eat lean proteins. Heart-healthy proteins include seafood, lean meats and poultry, eggs, beans, peas, nuts, seeds, and soy products. · Limit drinks and foods with added sugar. These include candy, desserts, and soda pop. Lifestyle changes  · If your doctor recommends it, get more exercise. Walking is a good choice. Bit by bit, increase the amount you walk every day. Try for at least 30 minutes on most days of the week. You also may want to swim, bike, or do other activities. · Do not smoke. If you need help quitting, talk to your doctor about stop-smoking programs and medicines. These can increase your chances of quitting for good. Quitting smoking may be the most important step you can take to protect your heart. It is never too late to quit. · Limit alcohol to 2 drinks a day for men and 1 drink a day for women. Too much alcohol can cause health problems. · Manage other health problems such as diabetes, high blood pressure, and high cholesterol. If you think you may have a problem with alcohol or drug use, talk to your doctor. Medicines  · Take your medicines exactly as prescribed. Call your doctor if you think you are having a problem with your medicine. · If your doctor recommends aspirin, take the amount directed each day. Make sure you take aspirin and not another kind of pain reliever, such as acetaminophen (Tylenol). When should you call for help? [id number] if you have symptoms of a heart attack.  These may include:  · Chest pain or pressure, or a strange feeling in the chest.  · Sweating. · Shortness of breath. · Pain, pressure, or a strange feeling in the back, neck, jaw, or upper belly or in one or both shoulders or arms. · Lightheadedness or sudden weakness. · A fast or irregular heartbeat. After you call 911, the  may tell you to chew 1 adult-strength or 2 to 4 low-dose aspirin. Wait for an ambulance. Do not try to drive yourself. Watch closely for changes in your health, and be sure to contact your doctor if you have any problems. Where can you learn more? Go to https://[employer]. org and sign in to your [employer] account. Enter Q809 in the [employer] box to learn more about \"A Healthy Heart: Care Instructions. \"     If you do not have an account, please click on the \"Sign Up Now\" link. Current as of: December 16, 2019               Content Version: 12.5  © 8515-7634 [employer], [employer]orporated. Care instructions adapted under license by [facility] [place] ([facility]). If you have questions about a medical condition or this instruction, always ask your healthcare professional. [place] [address] any warranty or liability for your use of this information.

## 2021-09-02 NOTE — TELEPHONE ENCOUNTER
I tried calling this pt on 08/26 and I tried again today so that I could get him scheduled for a heart cath

## 2021-12-03 ENCOUNTER — PRE-ADMISSION TESTING (OUTPATIENT)
Dept: PREADMISSION TESTING | Facility: HOSPITAL | Age: 81
End: 2021-12-03

## 2021-12-03 ENCOUNTER — TRANSCRIBE ORDERS (OUTPATIENT)
Dept: LAB | Facility: HOSPITAL | Age: 81
End: 2021-12-03

## 2021-12-03 VITALS
DIASTOLIC BLOOD PRESSURE: 66 MMHG | HEART RATE: 59 BPM | SYSTOLIC BLOOD PRESSURE: 125 MMHG | HEIGHT: 72 IN | BODY MASS INDEX: 32.64 KG/M2 | RESPIRATION RATE: 16 BRPM | OXYGEN SATURATION: 97 % | WEIGHT: 240.96 LBS

## 2021-12-03 DIAGNOSIS — Z01.818 PREOP TESTING: Primary | ICD-10-CM

## 2021-12-03 LAB
ALBUMIN SERPL-MCNC: 4.3 G/DL (ref 3.5–5.2)
ALBUMIN/GLOB SERPL: 1.4 G/DL
ALP SERPL-CCNC: 81 U/L (ref 39–117)
ALT SERPL W P-5'-P-CCNC: 18 U/L (ref 1–41)
ANION GAP SERPL CALCULATED.3IONS-SCNC: 11 MMOL/L (ref 5–15)
AST SERPL-CCNC: 24 U/L (ref 1–40)
BASOPHILS # BLD AUTO: 0.03 10*3/MM3 (ref 0–0.2)
BASOPHILS NFR BLD AUTO: 0.4 % (ref 0–1.5)
BILIRUB SERPL-MCNC: 0.3 MG/DL (ref 0–1.2)
BUN SERPL-MCNC: 16 MG/DL (ref 8–23)
BUN/CREAT SERPL: 21.6 (ref 7–25)
CALCIUM SPEC-SCNC: 9.3 MG/DL (ref 8.6–10.5)
CHLORIDE SERPL-SCNC: 100 MMOL/L (ref 98–107)
CO2 SERPL-SCNC: 28 MMOL/L (ref 22–29)
CREAT SERPL-MCNC: 0.74 MG/DL (ref 0.76–1.27)
DEPRECATED RDW RBC AUTO: 55.8 FL (ref 37–54)
EOSINOPHIL # BLD AUTO: 0.43 10*3/MM3 (ref 0–0.4)
EOSINOPHIL NFR BLD AUTO: 5.8 % (ref 0.3–6.2)
ERYTHROCYTE [DISTWIDTH] IN BLOOD BY AUTOMATED COUNT: 16.7 % (ref 12.3–15.4)
GFR SERPL CREATININE-BSD FRML MDRD: 101 ML/MIN/1.73
GLOBULIN UR ELPH-MCNC: 3 GM/DL
GLUCOSE SERPL-MCNC: 112 MG/DL (ref 65–99)
HCT VFR BLD AUTO: 43.7 % (ref 37.5–51)
HGB BLD-MCNC: 14.3 G/DL (ref 13–17.7)
IMM GRANULOCYTES # BLD AUTO: 0.04 10*3/MM3 (ref 0–0.05)
IMM GRANULOCYTES NFR BLD AUTO: 0.5 % (ref 0–0.5)
LYMPHOCYTES # BLD AUTO: 2.31 10*3/MM3 (ref 0.7–3.1)
LYMPHOCYTES NFR BLD AUTO: 31 % (ref 19.6–45.3)
MCH RBC QN AUTO: 29.9 PG (ref 26.6–33)
MCHC RBC AUTO-ENTMCNC: 32.7 G/DL (ref 31.5–35.7)
MCV RBC AUTO: 91.4 FL (ref 79–97)
MONOCYTES # BLD AUTO: 1.03 10*3/MM3 (ref 0.1–0.9)
MONOCYTES NFR BLD AUTO: 13.8 % (ref 5–12)
NEUTROPHILS NFR BLD AUTO: 3.62 10*3/MM3 (ref 1.7–7)
NEUTROPHILS NFR BLD AUTO: 48.5 % (ref 42.7–76)
NRBC BLD AUTO-RTO: 0 /100 WBC (ref 0–0.2)
PLATELET # BLD AUTO: 169 10*3/MM3 (ref 140–450)
PMV BLD AUTO: 11.4 FL (ref 6–12)
POTASSIUM SERPL-SCNC: 3.7 MMOL/L (ref 3.5–5.2)
PROT SERPL-MCNC: 7.3 G/DL (ref 6–8.5)
RBC # BLD AUTO: 4.78 10*6/MM3 (ref 4.14–5.8)
SODIUM SERPL-SCNC: 139 MMOL/L (ref 136–145)
WBC NRBC COR # BLD: 7.46 10*3/MM3 (ref 3.4–10.8)

## 2021-12-03 PROCEDURE — 36415 COLL VENOUS BLD VENIPUNCTURE: CPT

## 2021-12-03 PROCEDURE — 80053 COMPREHEN METABOLIC PANEL: CPT

## 2021-12-03 PROCEDURE — 85025 COMPLETE CBC W/AUTO DIFF WBC: CPT

## 2021-12-03 RX ORDER — ZINC GLUCONATE 50 MG
50 TABLET ORAL DAILY
COMMUNITY
End: 2022-07-11

## 2021-12-03 NOTE — DISCHARGE INSTRUCTIONS
DAY OF SURGERY INSTRUCTIONS          ARRIVAL TIME: AS DIRECTED BY OFFICE    YOU MAY TAKE THE FOLLOWING MEDICATION(S) THE MORNING OF SURGERY WITH A SIP OF WATER: ***propranolol       ALL OTHER HOME MEDICATION CHECK WITH YOUR PHYSICIAN      DO NOT TAKE ANY ERECTILE DYSFUNCTION MEDICATIONS (EX: CIALIS, VIAGRA) 24 HOURS PRIOR TO SURGERY                      MANAGING PAIN AFTER SURGERY    We know you are probably wondering what your pain will be like after surgery.  Following surgery it is unrealistic to expect you will not have pain.   Pain is how our bodies let us know that something is wrong or cautions us to be careful.  That said, our goal is to make your pain tolerable.    Methods we may use to treat your pain include (oral or IV medications, PCAs, epidurals, nerve blocks, etc.)   While some procedures require IV pain medications for a short time after surgery, transitioning to pain medications by mouth allows for better management of pain.   Your nurse will encourage you to take oral pain medications whenever possible.  IV medications work almost immediately, but only last a short while.  Taking medications by mouth allows for a more constant level of medication in your blood stream for a longer period of time.      Once your pain is out of control it is harder to get back under control.  It is important you are aware when your next dose of pain medication is due.  If you are admitted, your nurse may write the time of your next dose on the white board in your room to help you remember.      We are interested in your pain and encourage you to inform us about aggravating factors during your visit.   Many times a simple repositioning every few hours can make a big difference.    If your physician says it is okay, do not let your pain prevent you from getting out of bed. Be sure to call your nurse for assistance prior to getting up so you do not fall.      Before surgery, please decide your tolerable pain goal.   These faces help describe the pain ratings we use on a 0-10 scale.   Be prepared to tell us your goal and whether or not you take pain or anxiety medications at home.          BEFORE YOU COME TO THE HOSPITAL  (Pre-op instructions)  • Do not eat, drink, smoke or chew gum after midnight the night before surgery.  This also includes no mints.  • Morning of surgery take only the medicines you have been instructed with a sip of water unless otherwise instructed  by your physician.  • Do not shave, wear makeup or dark nail polish.  • Remove all jewelry including rings.  • Leave anything you consider valuable at home.  • Leave your suitcase in the car until after your surgery.  • Bring the following with you if applicable:  o Picture ID and insurance, Medicare or Medicaid cards  o Co-pay/deductible required by insurance (cash, check, credit card)  o Copy of advance directive, living will or power-of- documents if not brought to pre-work  o CPAP or BIPAP mask and tubing  o Relaxation aids ( book, magazine), etc.  o Hearing aids                        ON THE DAY OF SURGERY  · On the day of surgery check in at registration located at the main entrance of the hospital. Only one family member or friend are allowed per patient.  ? You will be registered and given a beeper with instructions where to wait in the main lobby.  ? When your beeper lights up and vibrates a member of the Outpatient Surgery staff will meet you at the double doors under the stair steps and escort you to your preoperative room.   · You may have cloth compression devices placed on your legs. These help to prevent blood clots and reduce swelling in your legs.  · An IV may be inserted into one of your veins.  · In the operating room, you may be given one or more of the following:  ? A medicine to help you relax (sedative).  ? A medicine to numb the area (local anesthetic).  ? A medicine to make you fall asleep (general anesthetic).  ? A medicine that  "is injected into an area of your body to numb everything below the injection site (regional anesthetic).  · Your surgical site will be marked or identified.  · You may be given an antibiotic through your IV to help prevent infection.  Contact a health care provider if you:  · Develop a fever of more than 100.4°F (38°C) or other feelings of illness during the 48 hours before your surgery.  · Have symptoms that get worse.  Have questions or concerns about your surgery    General Anesthesia/Surgery, Adult  General anesthesia is the use of medicines to make a person \"go to sleep\" (unconscious) for a medical procedure. General anesthesia must be used for certain procedures, and is often recommended for procedures that:  · Last a long time.  · Require you to be still or in an unusual position.  · Are major and can cause blood loss.  The medicines used for general anesthesia are called general anesthetics. As well as making you unconscious for a certain amount of time, these medicines:  · Prevent pain.  · Control your blood pressure.  · Relax your muscles.  Tell a health care provider about:  · Any allergies you have.  · All medicines you are taking, including vitamins, herbs, eye drops, creams, and over-the-counter medicines.  · Any problems you or family members have had with anesthetic medicines.  · Types of anesthetics you have had in the past.  · Any blood disorders you have.  · Any surgeries you have had.  · Any medical conditions you have.  · Any recent upper respiratory, chest, or ear infections.  · Any history of:  ? Heart or lung conditions, such as heart failure, sleep apnea, asthma, or chronic obstructive pulmonary disease (COPD).  ?  service.  ? Depression or anxiety.  · Any tobacco or drug use, including marijuana or alcohol use.  · Whether you are pregnant or may be pregnant.  What are the risks?  Generally, this is a safe procedure. However, problems may occur, including:  · Allergic " reaction.  · Lung and heart problems.  · Inhaling food or liquid from the stomach into the lungs (aspiration).  · Nerve injury.  · Air in the bloodstream, which can lead to stroke.  · Extreme agitation or confusion (delirium) when you wake up from the anesthetic.  · Waking up during your procedure and being unable to move. This is rare.  These problems are more likely to develop if you are having a major surgery or if you have an advanced or serious medical condition. You can prevent some of these complications by answering all of your health care provider's questions thoroughly and by following all instructions before your procedure.  General anesthesia can cause side effects, including:  · Nausea or vomiting.  · A sore throat from the breathing tube.  · Hoarseness.  · Wheezing or coughing.  · Shaking chills.  · Tiredness.  · Body aches.  · Anxiety.  · Sleepiness or drowsiness.  · Confusion or agitation.  RISKS AND COMPLICATIONS OF SURGERY  Your health care provider will discuss possible risks and complications with you before surgery. Common risks and complications include:    · Problems due to the use of anesthetics.  · Blood loss and replacement (does not apply to minor surgical procedures).  · Temporary increase in pain due to surgery.  · Uncorrected pain or problems that the surgery was meant to correct.  · Infection.  · New damage.    What happens before the procedure?    Medicines  Ask your health care provider about:  · Changing or stopping your regular medicines. This is especially important if you are taking diabetes medicines or blood thinners.  · Taking medicines such as aspirin and ibuprofen. These medicines can thin your blood. Do not take these medicines unless your health care provider tells you to take them.  · Taking over-the-counter medicines, vitamins, herbs, and supplements. Do not take these during the week before your procedure unless your health care provider approves them.  General  instructions  · Starting 3-6 weeks before the procedure, do not use any products that contain nicotine or tobacco, such as cigarettes and e-cigarettes. If you need help quitting, ask your health care provider.  · If you brush your teeth on the morning of the procedure, make sure to spit out all of the toothpaste.  · Tell your health care provider if you become ill or develop a cold, cough, or fever.  · If instructed by your health care provider, bring your sleep apnea device with you on the day of your surgery (if applicable).  · Ask your health care provider if you will be going home the same day, the following day, or after a longer hospital stay.  ? Plan to have someone take you home from the hospital or clinic.  ? Plan to have a responsible adult care for you for at least 24 hours after you leave the hospital or clinic. This is important.  What happens during the procedure?  · You will be given anesthetics through both of the following:  ? A mask placed over your nose and mouth.  ? An IV in one of your veins.  · You may receive a medicine to help you relax (sedative).  · After you are unconscious, a breathing tube may be inserted down your throat to help you breathe. This will be removed before you wake up.  · An anesthesia specialist will stay with you throughout your procedure. He or she will:  ? Keep you comfortable and safe by continuing to give you medicines and adjusting the amount of medicine that you get.  ? Monitor your blood pressure, pulse, and oxygen levels to make sure that the anesthetics do not cause any problems.  The procedure may vary among health care providers and hospitals.  What happens after the procedure?  · Your blood pressure, temperature, heart rate, breathing rate, and blood oxygen level will be monitored until the medicines you were given have worn off.  · You will wake up in a recovery area. You may wake up slowly.  · If you feel anxious or agitated, you may be given medicine to  help you calm down.  · If you will be going home the same day, your health care provider may check to make sure you can walk, drink, and urinate.  · Your health care provider will treat any pain or side effects you have before you go home.  · Do not drive for 24 hours if you were given a sedative.  Summary  · General anesthesia is used to keep you still and prevent pain during a procedure.  · It is important to tell your healthcare provider about your medical history and any surgeries you have had, and previous experience with anesthesia.  · Follow your healthcare provider’s instructions about when to stop eating, drinking, or taking certain medicines before your procedure.  · Plan to have someone take you home from the hospital or clinic.  This information is not intended to replace advice given to you by your health care provider. Make sure you discuss any questions you have with your health care provider.  Document Released: 03/26/2009 Document Revised: 08/03/2018 Document Reviewed: 08/03/2018  [employer] Interactive Patient Education © 2019 [employer]       Fall Prevention in Hospitals, Adult  As a hospital patient, your condition and the treatments you receive can increase your risk for falls. Some additional risk factors for falls in a hospital include:  · Being in an unfamiliar environment.  · Being on bed rest.  · Your surgery.  · Taking certain medicines.  · Your tubing requirements, such as intravenous (IV) therapy or catheters.  It is important that you learn how to decrease fall risks while at the hospital. Below are important tips that can help prevent falls.  SAFETY TIPS FOR PREVENTING FALLS  Talk about your risk of falling.  · Ask your health care provider why you are at risk for falling. Is it your medicine, illness, tubing placement, or something else?  · Make a plan with your health care provider to keep you safe from falls.  · Ask your health care provider or pharmacist about side effects of your  medicines. Some medicines can make you dizzy or affect your coordination.  Ask for help.  · Ask for help before getting out of bed. You may need to press your call button.  · Ask for assistance in getting safely to the toilet.  · Ask for a walker or cane to be put at your bedside. Ask that most of the side rails on your bed be placed up before your health care provider leaves the room.  · Ask family or friends to sit with you.  · Ask for things that are out of your reach, such as your glasses, hearing aids, telephone, bedside table, or call button.  Follow these tips to avoid falling:  · Stay lying or seated, rather than standing, while waiting for help.  · Wear rubber-soled slippers or shoes whenever you walk in the hospital.  · Avoid quick, sudden movements.  ¨ Change positions slowly.  ¨ Sit on the side of your bed before standing.  ¨ Stand up slowly and wait before you start to walk.  · Let your health care provider know if there is a spill on the floor.  · Pay careful attention to the medical equipment, electrical cords, and tubes around you.  · When you need help, use your call button by your bed or in the bathroom. Wait for one of your health care providers to help you.  · If you feel dizzy or unsure of your footing, return to bed and wait for assistance.  · Avoid being distracted by the TV, telephone, or another person in your room.  · Do not lean or support yourself on rolling objects, such as IV poles or bedside tables.     This information is not intended to replace advice given to you by your health care provider. Make sure you discuss any questions you have with your health care provider.     Document Released: 12/15/2001 Document Revised: 01/08/2016 Document Reviewed: 08/25/2013  [employer] Interactive Patient Education ©2016 [employer]       [facility]  CHG 4% Patient Instruction Sheet    Chlorhexidine Before Surgery  Chlorhexidine gluconate (CHG) is a germ-killing (antiseptic) solution  that is used to clean the skin. It gets rid of the bacteria that normally live on the skin. Cleaning your skin with CHG before surgery helps lower the risk for infection after surgery.    How to use CHG solution  · You will take 2 showers, one shower the night before surgery, the second shower the morning of surgery before coming to the hospital.  · Use CHG only as told by your health care provider, and follow the instructions on the label.  · Use CHG solution while taking a shower. Follow these steps when using CHG solution (unless your health care provider gives you different instructions):  1. Start the shower.  2. Use your normal soap and shampoo to wash your face and hair.  3. Turn off the shower or move out of the shower stream.  4. Pour the CHG onto a clean washcloth. Do not use any type of brush or rough-edged sponge.  5. Starting at your neck, lather your body down to your toes. Make sure you:  6. Pay special attention to the part of your body where you will be having surgery. Scrub this area for at least 1 minute.  7. Use the full amount of CHG as directed. Usually, this is one half bottle for each shower.  8. Do not use CHG on your head or face. If the solution gets into your ears or eyes, rinse them well with water.  9. Avoid your genital area.  10. Avoid any areas of skin that have broken skin, cuts, or scrapes.  11. Scrub your back and under your arms. Make sure to wash skin folds.  12. Let the lather sit on your skin for 1-2 minutes or as long as told by your health care  provider.  13. Thoroughly rinse your entire body in the shower. Make sure that all body creases and crevices are rinsed well.  14. Dry off with a clean towel. Do not put any substances on your body afterward, such as powder, lotion, or perfume.  15. Put on clean clothes or pajamas.  16. If it is the night before your surgery, sleep in clean sheets.    What are the risks?  Risks of using CHG include:  · A skin reaction.  · Hearing  loss, if CHG gets in your ears.  · Eye injury, if CHG gets in your eyes and is not rinsed out.  · The CHG product catching fire.  Make sure that you avoid smoking and flames after applying CHG to your skin.  Do not use CHG:  · If you have a chlorhexidine allergy or have previously reacted to chlorhexidine.  · On babies younger than 2 months of age.      On the day of surgery, when you are taken to your room in Outpatient Surgery you will be given a CHG prepackaged cloth to wipe the site for your surgery.  How to use CHG prepackaged cloths  · Follow the instructions on the label.  · Use the CHG cloth on clean, dry skin. Follow these steps when using a CHG cloth (unless your health care provider gives you different instructions):  1. Using the CHG cloth, vigorously scrub the part of your body where you will be having surgery. Scrub using a back-and-forth motion for 3 minutes. The area on your body should be completely wet with CHG when you are finished scrubbing.  2. Do not rinse. Discard the cloth and let the area air-dry for 1 minute. Do not put any substances on your body afterward, such as powder, lotion, or perfume.  Contact a health care provider if:  · Your skin gets irritated after scrubbing.  · You have questions about using your solution or cloth.  Get help right away if:  · Your eyes become very red or swollen.  · Your eyes itch badly.  · Your skin itches badly and is red or swollen.  · Your hearing changes.  · You have trouble seeing.  · You have swelling or tingling in your mouth or throat.  · You have trouble breathing.  · You swallow any chlorhexidine.  Summary  · Chlorhexidine gluconate (CHG) is a germ-killing (antiseptic) solution that is used to clean the skin. Cleaning your skin with CHG before surgery helps lower the risk for infection after surgery.  · You may be given CHG to use at home. It may be in a bottle or in a prepackaged cloth to use on your skin. Carefully follow your health care  provider's instructions and the instructions on the product label.  · Do not use CHG if you have a chlorhexidine allergy.  · Contact your health care provider if your skin gets irritated after scrubbing.  This information is not intended to replace advice given to you by your health care provider. Make sure you discuss any questions you have with your health care provider.  Document Released: 09/11/2013 Document Revised: 11/15/2018 Document Reviewed: 11/15/2018  [employer] Interactive Patient Education © 2019 [employer]          PATIENT/FAMILY/RESPONSIBLE PARTY VERBALIZES UNDERSTANDING OF ABOVE EDUCATION.  COPY OF PAIN SCALE GIVEN AND REVIEWED WITH VERBALIZED UNDERSTANDING.

## 2021-12-07 ENCOUNTER — LAB (OUTPATIENT)
Dept: LAB | Facility: HOSPITAL | Age: 81
End: 2021-12-07

## 2021-12-07 ENCOUNTER — TELEPHONE (OUTPATIENT)
Dept: HEMATOLOGY | Age: 81
End: 2021-12-07

## 2021-12-07 LAB — SARS-COV-2 ORF1AB RESP QL NAA+PROBE: NOT DETECTED

## 2021-12-07 PROCEDURE — U0004 COV-19 TEST NON-CDC HGH THRU: HCPCS | Performed by: SPECIALIST

## 2021-12-07 PROCEDURE — C9803 HOPD COVID-19 SPEC COLLECT: HCPCS | Performed by: SPECIALIST

## 2021-12-07 PROCEDURE — U0005 INFEC AGEN DETEC AMPLI PROBE: HCPCS | Performed by: SPECIALIST

## 2021-12-07 NOTE — TELEPHONE ENCOUNTER
[facility] was calling to check on a referral they had faxed over. Spoke with Leonard Nice, who advised she currently does not have a referral for this patient.      Dermatology is going to refax referral and I will call patient as soon as we get referral.

## 2021-12-10 ENCOUNTER — HOSPITAL ENCOUNTER (OUTPATIENT)
Facility: HOSPITAL | Age: 81
Setting detail: HOSPITAL OUTPATIENT SURGERY
Discharge: HOME | End: 2021-12-10
Attending: SPECIALIST | Admitting: SPECIALIST

## 2021-12-10 ENCOUNTER — ANESTHESIA EVENT (OUTPATIENT)
Dept: PERIOP | Facility: HOSPITAL | Age: 81
End: 2021-12-10

## 2021-12-10 ENCOUNTER — TELEPHONE (OUTPATIENT)
Dept: CARDIOLOGY CLINIC | Age: 81
End: 2021-12-10

## 2021-12-10 ENCOUNTER — ANESTHESIA (OUTPATIENT)
Dept: PERIOP | Facility: HOSPITAL | Age: 81
End: 2021-12-10

## 2021-12-10 VITALS
TEMPERATURE: 97.1 F | SYSTOLIC BLOOD PRESSURE: 139 MMHG | RESPIRATION RATE: 16 BRPM | DIASTOLIC BLOOD PRESSURE: 80 MMHG | OXYGEN SATURATION: 99 % | HEART RATE: 59 BPM

## 2021-12-10 LAB — GLUCOSE BLDC GLUCOMTR-MCNC: 137 MG/DL (ref 70–130)

## 2021-12-10 PROCEDURE — 82962 GLUCOSE BLOOD TEST: CPT

## 2021-12-10 PROCEDURE — G0463 HOSPITAL OUTPT CLINIC VISIT: HCPCS | Performed by: SPECIALIST

## 2021-12-10 RX ORDER — SODIUM CHLORIDE, SODIUM LACTATE, POTASSIUM CHLORIDE, CALCIUM CHLORIDE 600; 310; 30; 20 MG/100ML; MG/100ML; MG/100ML; MG/100ML
1000 INJECTION, SOLUTION INTRAVENOUS CONTINUOUS
Status: DISCONTINUED | OUTPATIENT
Start: 2021-12-10 | End: 2021-12-10 | Stop reason: HOSPADM

## 2021-12-10 RX ORDER — SODIUM CHLORIDE 0.9 % (FLUSH) 0.9 %
3 SYRINGE (ML) INJECTION AS NEEDED
Status: DISCONTINUED | OUTPATIENT
Start: 2021-12-10 | End: 2021-12-10 | Stop reason: HOSPADM

## 2021-12-10 RX ORDER — LIDOCAINE HYDROCHLORIDE 10 MG/ML
0.5 INJECTION, SOLUTION EPIDURAL; INFILTRATION; INTRACAUDAL; PERINEURAL ONCE AS NEEDED
Status: DISCONTINUED | OUTPATIENT
Start: 2021-12-10 | End: 2021-12-10 | Stop reason: HOSPADM

## 2021-12-10 RX ADMIN — SODIUM CHLORIDE, POTASSIUM CHLORIDE, SODIUM LACTATE AND CALCIUM CHLORIDE 1000 ML: 600; 310; 30; 20 INJECTION, SOLUTION INTRAVENOUS at 06:16

## 2021-12-10 NOTE — TELEPHONE ENCOUNTER
Dr. 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 Zaira Gibson and not the Nurse Practitioner. Patients appoint is not until march and they were wanting it moved sooner so he can have his surgery, she stated the reschedule date has not been made yet.

## 2021-12-10 NOTE — ANESTHESIA PREPROCEDURE EVALUATION
Anesthesia Evaluation     Patient summary reviewed   no history of anesthetic complications:  NPO Solid Status: > 6 hours  NPO Liquid Status: > 6 hours           Airway   Mallampati: II  TM distance: >3 FB  Neck ROM: full  no difficulty expected  Dental - normal exam     Pulmonary    (+) COPD, sleep apnea on CPAP,   (-) no home oxygen  Cardiovascular   Exercise tolerance: good (4-7 METS)    ECG reviewed    (+) hypertension well controlled, CAD, hyperlipidemia,   (-) pacemaker, dysrhythmias, cardiac stents, CABG    ROS comment: Stress test:   Summary    Dobutamine stress echocardiogram with electrocardiographic and echocardiographic evidence of myocardial ischemia. 1-2 mm horizontal ST segment depression with dobutamine infusion and an appearance of global hypokinesis concerning for possible global ischemia.      No chest pain with dobutamine infusion.    Frequent atrial and ventricular ectopy with 2 runs of NSVT.    Appropriate hemodynamic response to dobutamine. 1.5 mm ST T wave changes in inferior leads with dobutamine. ECG portion is positive for ischemia by    diagnostic criteria.    The echocardiographic images are suboptimal because of uncertain axis or off axis acquisition but nevertheless wall motion appeared to be  hypercontractile at rest and there was a sense of the development of global hypokinesis with dobutamine infusion, concerning for ischemia, particularly possible global ischemia.       Neuro/Psych  (+) dizziness/light headedness, tremors (essential, followed by neurologist at [place]),     (-) seizures, neuromuscular disease, TIA, CVA  GI/Hepatic/Renal/Endo    (+)   diabetes mellitus type 2,   (-) liver disease, no renal disease    Musculoskeletal     Abdominal    Substance History      OB/GYN          Other   arthritis,    history of cancer                    Anesthesia Plan    (Patient recently had stress test 08/2021 that showed positive for ischemia with ST depression and hypokinesis.  Patient reports he was told that he needed a heart cath with stent placement, but cancelled by the patient because he had not talked to his cardiologist. Patient has been waiting until march appointment to followup. We discussed at length with Dr. Knott at bedside and decision made to cancel case for additional cardiac workup    Patient was visibly upset. Wife was at bedside and present to discussion. Dr. Knott's office to follow-up and arrange for cardiology workup.)

## 2021-12-28 NOTE — PROGRESS NOTES
OP HEMATOLOGY/ONCOLOGY CONSULTATION      Pt Name: Dulce Mullen  YOB: 1940  MRN: [id number]  Referring provider: [address] [place] [address]  Requesting provider: Sanchez Santoyo MD  Reason for consultation: Basal Cell Carcinoma  Date of evaluation: 1/3/2022    History Obtained From:  patient, electronic medical record    CHIEF COMPLAINT:    Chief Complaint   Patient presents with    New Patient     Basal Cell Carcinoma     HISTORY OF PRESENT ILLNESS:    Dulce Mullen is a 79 y.o.  male referred to the clinic by Dr. Sanchez Santoyo for evaluation of basal cell carcinoma. Oncology consultation is performed 1/3/2022. Primary care needs managed by JOVANNY Villarreal    CANCER HISTORY:    · 7/19/2016 basal cell carcinoma left superior posterior helix  · 7/19/2016 basal cell carcinoma left central forehead  · 3/19/2015 basal cell carcinoma left posterior shoulder  · 3/19/2015 basal cell carcinoma left lateral shoulder  · 3/19/2015 basal cell carcinoma left lateral shoulder, treated with Zyclara 4 days a week for 4 weeks  · 5/29/2014 basal cell carcinoma left central frontal scalp, [place] [facility] [place]    [place]/24/2021 basal cell carcinoma left posterior shoulder (punch biopsy) pathology consistent with [address] exhibiting infiltrative features, and deeply penetrating and associated with perineural involvement    6/28/2021 Left shoulder wide excision. Basal cell carcinoma with superficial and infiltrative features. Dermal cicatrax formation consistent with prior biopsy site. A few small nests of basal cell carcinoma focally involved in undesignated link peripheral surgical margin. The remaining inked surgical margins are negative for malignancy    12/10/2021 scheduled for wide excision left shoulder by Dr. Patito Sanches (stress test 08/2021 showed positive for ischemia with ST depression and hypokinesis.  Patient reports he was told that he needed a heart cath with stent placement, but cancelled by the patient because he had not talked to his cardiologist. Patient has been waiting until March, 2022 appointment to follow-up with cardiology. decision was made to cancel case for additional cardiac workup)    CBC 12/3/2021 at [facility]: WBC 7.46, H/H 14.3/43.7 with MCV 91.4, platelets 850,098  CMP 12/10/2021 at [facility]: Unremarkable    3 to 3.5 inch surgical incision left shoulder well approximated, well-healed. No supra/infraclavicular, axillary or epitrochlear lymphadenopathy on the left. Recommend to proceed with cardiac evaluation, to be followed by wide excision left shoulder. Await pathology for further treatment recommendations, likely XRT. Patient's spouse states she will call Dr. Can Frye office for an earlier appointment to obtain cardiac clearance.     Past Medical History:   Diagnosis Date    Basal cell carcinoma     HTN (hypertension)     Hyperlipidemia      Past Surgical History:   Procedure Laterality Date    CATARACT REMOVAL      CHOLECYSTECTOMY      FINGER TRIGGER RELEASE Left     thumb    SHOULDER ARTHROPLASTY Right     SINUS SURGERY      x5       Current Outpatient Medications:     citalopram (CELEXA) 10 MG tablet, Take 10 mg by mouth daily, Disp: , Rfl:     budesonide (PULMICORT) 0.5 MG/2ML nebulizer suspension, Take 1 ampule by nebulization 2 times daily, Disp: , Rfl:     primidone (MYSOLINE) 50 MG tablet, Take 50 mg by mouth nightly, Disp: , Rfl:     metFORMIN (GLUCOPHAGE) 500 MG tablet, Take 1,000 mg by mouth 2 times daily (with meals) , Disp: , Rfl:     Saw [facility] 450 MG CAPS, Take by mouth daily, Disp: , Rfl:     simvastatin (ZOCOR) 40 MG tablet, Take 40 mg by mouth nightly, Disp: , Rfl:     propranolol (INDERAL) 80 MG tablet, Take 80 mg by mouth 2 times daily, Disp: , Rfl:     montelukast (SINGULAIR) 10 MG tablet, Take 10 mg by mouth nightly, Disp: , Rfl:     SITagliptin (JANUVIA) 100 MG tablet, Take 100 mg by mouth daily, Disp: , Rfl:     Green Tea 250 MG CAPS, Take 500 mg by mouth daily (Patient not taking: Reported on 1/4/2022), Disp: , Rfl:     Biotin 5 MG CAPS, Take by mouth daily (Patient not taking: Reported on 1/4/2022), Disp: , Rfl:    Allergies: Allergies   Allergen Reactions    Chlorethamine [Ethylenediamine]     Silicone      Social History   Resides in [place], [place]    Retired /dept. head 2001    History reviewed. No pertinent family history. Noncontributory    Health Maintenance   Topic Date Due    COVID-19 Vaccine (1) Never done    Lipid screen  Never done    Depression Screen  Never done    DTaP/Tdap/Td vaccine (1 - Tdap) Never done    Shingles Vaccine (1 of 2) Never done    Pneumococcal 65+ years Vaccine (1 of 1 - PPSV23) Never done    Potassium monitoring  07/24/2015    Creatinine monitoring  07/24/2015    Annual Wellness Visit (AWV)  Never done    Flu vaccine (1) Never done    Hepatitis A vaccine  Aged Out    Hib vaccine  Aged Out    Meningococcal (ACWY) vaccine  Aged Out     Subjective   Review of Systems   Constitutional: Negative for fatigue and fever. HENT: Positive for dental problem (Recent root canal). Negative for hearing loss, mouth sores, nosebleeds, sore throat and trouble swallowing. Eyes: Negative for discharge and itching. Respiratory: Negative for cough, shortness of breath and wheezing. Cardiovascular: Negative for chest pain, palpitations and leg swelling. Gastrointestinal: Negative for abdominal pain, constipation, diarrhea, nausea and vomiting. Endocrine: Negative for cold intolerance and heat intolerance. Genitourinary: Negative for dysuria, frequency, hematuria and urgency. Musculoskeletal: Positive for joint swelling. Negative for arthralgias and myalgias. Skin: Negative for pallor and rash. H/o multiple basal cell skin cancers   Allergic/Immunologic: Positive for environmental allergies. Negative for immunocompromised state.    Neurological: Positive for tremors, numbness (hands) and headaches. Negative for seizures and syncope. Hematological: Negative for adenopathy. Does not bruise/bleed easily. Psychiatric/Behavioral: Negative for agitation, behavioral problems and confusion. The patient is not nervous/anxious. Objective   Physical Exam  Vitals reviewed. Constitutional:       General: He is not in acute distress. Appearance: He is well-developed. He is not toxic-appearing or diaphoretic. Comments: Wearing a facial mask. Overweight    HENT:      Head: Normocephalic and atraumatic. Right Ear: External ear normal.      Left Ear: External ear normal.      Nose: Nose normal.      Mouth/Throat:      Mouth: Mucous membranes are moist.   Eyes:      General: No scleral icterus. Right eye: No discharge. Left eye: No discharge. Conjunctiva/sclera: Conjunctivae normal.   Neck:      Trachea: No tracheal deviation. Cardiovascular:      Rate and Rhythm: Normal rate and regular rhythm. Pulmonary:      Effort: Pulmonary effort is normal. No respiratory distress. Breath sounds: Normal breath sounds. No wheezing or rales. Chest:   Breasts:      Right: No axillary adenopathy or supraclavicular adenopathy. Left: No axillary adenopathy or supraclavicular adenopathy. Abdominal:      General: Bowel sounds are normal. There is no distension. Palpations: Abdomen is soft. Tenderness: There is no abdominal tenderness. There is no guarding. Comments: Protuberant, though soft   Genitourinary:     Comments: Exam deferred  Musculoskeletal:         General: No tenderness or deformity. Cervical back: Neck supple. No muscular tenderness. Comments: Normal ROM all four extremities   Lymphadenopathy:      Cervical:      Right cervical: No superficial, deep or posterior cervical adenopathy. Left cervical: No superficial, deep or posterior cervical adenopathy.       Upper Body:      Right upper body: No supraclavicular or axillary adenopathy. Left upper body: No supraclavicular, axillary or epitrochlear adenopathy. Comments:      Skin:     General: Skin is warm and dry. Findings: No rash. Neurological:      Mental Status: He is alert and oriented to person, place, and time. Comments: follows commands, non-focal   Psychiatric:         Behavior: Behavior normal. Behavior is cooperative. Thought Content: Thought content normal.         Judgment: Judgment normal.      Comments: Alert and oriented to person, place and time. /70   Pulse 79   Ht 6' (1.829 m)   Wt 237 lb (107.5 kg)   SpO2 95%   BMI 32.14 kg/m²   Wt Readings from Last 3 Encounters:   01/03/22 237 lb (107.5 kg)   08/31/21 238 lb (108 kg)   08/18/21 234 lb (106.1 kg)     Labs reviewed by me:  CBC:  Lab Results   Component Value Date    WBC 6.94 01/03/2022    HGB 15.4 01/03/2022    HCT 43.6 01/03/2022    MCV 90.8 01/03/2022     01/03/2022    LABLYMP 2.07 07/24/2014    LYMPHOPCT 27.8 01/03/2022    RBC 4.80 01/03/2022    MCH 32.1 01/03/2022    MCHC 35.3 01/03/2022    RDW 15.5 (H) 01/03/2022       ASSESSMENT/PLAN:    1. Basal cell carcinoma of left shoulder    2. Abnormal stress test       In need of cardiac clearance prior to planned wide excision left shoulder    Patient has an appointment with cardiologist, Dr. Angelica Purvis in March, 2022. Patient's spouse states she will call and request an earlier appointment to obtain cardiac clearance and then proceed with wide excision left shoulder as previously planned. Await pathology for further treatment recommendations, likely to include referral for radiation therapy. Keep scheduled follow-up appointments with Dr. Sanchez Santoyo routine skin evaluations, as well as general surgeon, Dr. Cherylene Coffer. Findings were discussed with Dr. Kim Odom, who agrees with the plan of care above. Recommendations discussed with Maryann Ortiz and his spouse. All questions answered.     Health Maintenance:  Colonoscopy: 2018    No orders of the defined types were placed in this encounter. Return in about 5 weeks (around 2/9/2022) for follow up with JOVANNY Mckeon. I have seen, examined and reviewed this patient medication list, appropriate labs and imaging studies. I reviewed relevant medical records and others physicians notes. I discussed the plan of care with the patient. I answered all questions to the patients satisfaction. I have also reviewed the chief complaint (CC) and part of the history (History of Present Illness (HPI), Past Family Social History [facility]), or Review of Systems (ROS) and made changes when appropriated. Medical record from Dr. Delia Stephens and Dr. Jag Bhakta reviewed. Dictated utilizing Dragon transcription software.       JOVANNY Mckeon  5:22 PM  1/4/2022

## 2021-12-29 DIAGNOSIS — C44.91 BASAL CELL CARCINOMA (BCC), UNSPECIFIED SITE: Primary | ICD-10-CM

## 2021-12-29 DIAGNOSIS — J82.81: Primary | ICD-10-CM

## 2022-01-03 ENCOUNTER — OFFICE VISIT (OUTPATIENT)
Dept: HEMATOLOGY | Age: 82
End: 2022-01-03
Payer: MEDICARE

## 2022-01-03 ENCOUNTER — HOSPITAL ENCOUNTER (OUTPATIENT)
Dept: INFUSION THERAPY | Age: 82
Discharge: HOME | End: 2022-01-03
Payer: MEDICARE

## 2022-01-03 VITALS
DIASTOLIC BLOOD PRESSURE: 70 MMHG | SYSTOLIC BLOOD PRESSURE: 130 MMHG | HEIGHT: 72 IN | OXYGEN SATURATION: 95 % | HEART RATE: 79 BPM | BODY MASS INDEX: 32.1 KG/M2 | WEIGHT: 237 LBS

## 2022-01-03 DIAGNOSIS — C44.619 BASAL CELL CARCINOMA OF LEFT SHOULDER: Primary | ICD-10-CM

## 2022-01-03 DIAGNOSIS — R94.39 ABNORMAL STRESS TEST: ICD-10-CM

## 2022-01-03 DIAGNOSIS — C44.91 BASAL CELL CARCINOMA (BCC), UNSPECIFIED SITE: ICD-10-CM

## 2022-01-03 LAB
BASOPHILS ABSOLUTE: 0.03 K/UL (ref 0.01–0.08)
BASOPHILS RELATIVE PERCENT: 0.4 % (ref 0.1–1.2)
EOSINOPHILS ABSOLUTE: 0.37 K/UL (ref 0.04–0.54)
EOSINOPHILS RELATIVE PERCENT: 5.3 % (ref 0.7–7)
HCT VFR BLD CALC: 43.6 % (ref 40.1–51)
HEMOGLOBIN: 15.4 G/DL (ref 13.7–17.5)
LYMPHOCYTES ABSOLUTE: 1.93 K/UL (ref 1.18–3.74)
LYMPHOCYTES RELATIVE PERCENT: 27.8 % (ref 19.3–53.1)
MCH RBC QN AUTO: 32.1 PG (ref 25.7–32.2)
MCHC RBC AUTO-ENTMCNC: 35.3 G/DL (ref 32.3–36.5)
MCV RBC AUTO: 90.8 FL (ref 79–92.2)
MONOCYTES ABSOLUTE: 0.9 K/UL (ref 0.24–0.82)
MONOCYTES RELATIVE PERCENT: 13 % (ref 4.7–12.5)
NEUTROPHILS ABSOLUTE: 3.71 K/UL (ref 1.56–6.13)
NEUTROPHILS RELATIVE PERCENT: 53.5 % (ref 34–71.1)
PDW BLD-RTO: 15.5 % (ref 11.6–14.4)
PLATELET # BLD: 172 K/UL (ref 163–337)
PMV BLD AUTO: 11 FL (ref 7.4–10.4)
RBC # BLD: 4.8 M/UL (ref 4.63–6.08)
WBC # BLD: 6.94 K/UL (ref 4.23–9.07)

## 2022-01-03 PROCEDURE — 99213 OFFICE O/P EST LOW 20 MIN: CPT | Performed by: NURSE PRACTITIONER

## 2022-01-03 PROCEDURE — 1036F TOBACCO NON-USER: CPT | Performed by: NURSE PRACTITIONER

## 2022-01-03 PROCEDURE — G8417 CALC BMI ABV UP PARAM F/U: HCPCS | Performed by: NURSE PRACTITIONER

## 2022-01-03 PROCEDURE — 99212 OFFICE O/P EST SF 10 MIN: CPT

## 2022-01-03 PROCEDURE — G8484 FLU IMMUNIZE NO ADMIN: HCPCS | Performed by: NURSE PRACTITIONER

## 2022-01-03 PROCEDURE — 85025 COMPLETE CBC W/AUTO DIFF WBC: CPT

## 2022-01-03 PROCEDURE — 1123F ACP DISCUSS/DSCN MKR DOCD: CPT | Performed by: NURSE PRACTITIONER

## 2022-01-03 PROCEDURE — 36415 COLL VENOUS BLD VENIPUNCTURE: CPT

## 2022-01-03 PROCEDURE — 4040F PNEUMOC VAC/ADMIN/RCVD: CPT | Performed by: NURSE PRACTITIONER

## 2022-01-03 PROCEDURE — G8427 DOCREV CUR MEDS BY ELIG CLIN: HCPCS | Performed by: NURSE PRACTITIONER

## 2022-01-03 ASSESSMENT — ENCOUNTER SYMPTOMS
CONSTIPATION: 0
SORE THROAT: 0
EYE ITCHING: 0
VOMITING: 0
SHORTNESS OF BREATH: 0
TROUBLE SWALLOWING: 0
EYE DISCHARGE: 0
ABDOMINAL PAIN: 0
WHEEZING: 0
DIARRHEA: 0
COUGH: 0
NAUSEA: 0

## 2022-01-04 RX ORDER — CITALOPRAM 10 MG/1
10 TABLET ORAL DAILY
COMMUNITY

## 2022-01-07 ENCOUNTER — TELEPHONE (OUTPATIENT)
Dept: CARDIOLOGY CLINIC | Age: 82
End: 2022-01-07

## 2022-01-07 NOTE — TELEPHONE ENCOUNTER
Spoke with the PT's wife, Herminio Lainez. Dr. Perla Neely does not have anything open at the [place] location, but we was able to schedule the PT out at the [facility]. PT is now scheduled on 02/09/22 at 10:45AM. I gave Herminio Lainez the address to [facility] office and explained if they have any trouble to call our office.

## 2022-01-07 NOTE — TELEPHONE ENCOUNTER
Pt wife called to get appt with Dr. Juan Aranda before March. Pt was due to have surgery  For basal carcinoma and they would not perform due to his stress test. He would need cardio clearance. She was told by Alanis Vazquez (according to Pt spouse ) they would need to speak to Dr. Juan Aranda. could not accommodate,  Please call patient for an earlier appt. Thank You!

## 2022-02-09 ENCOUNTER — OFFICE VISIT (OUTPATIENT)
Dept: CARDIOLOGY CLINIC | Age: 82
End: 2022-02-09
Payer: MEDICARE

## 2022-02-09 VITALS
SYSTOLIC BLOOD PRESSURE: 132 MMHG | HEIGHT: 72 IN | BODY MASS INDEX: 33.59 KG/M2 | WEIGHT: 248 LBS | DIASTOLIC BLOOD PRESSURE: 84 MMHG

## 2022-02-09 DIAGNOSIS — R94.39 ABNORMAL CARDIOVASCULAR STRESS TEST: ICD-10-CM

## 2022-02-09 DIAGNOSIS — R06.09 DOE (DYSPNEA ON EXERTION): ICD-10-CM

## 2022-02-09 DIAGNOSIS — I25.10 CORONARY ARTERY DISEASE INVOLVING NATIVE CORONARY ARTERY OF NATIVE HEART WITHOUT ANGINA PECTORIS: Primary | ICD-10-CM

## 2022-02-09 PROCEDURE — 1036F TOBACCO NON-USER: CPT | Performed by: INTERNAL MEDICINE

## 2022-02-09 PROCEDURE — G8417 CALC BMI ABV UP PARAM F/U: HCPCS | Performed by: INTERNAL MEDICINE

## 2022-02-09 PROCEDURE — 1123F ACP DISCUSS/DSCN MKR DOCD: CPT | Performed by: INTERNAL MEDICINE

## 2022-02-09 PROCEDURE — 93000 ELECTROCARDIOGRAM COMPLETE: CPT | Performed by: INTERNAL MEDICINE

## 2022-02-09 PROCEDURE — 99214 OFFICE O/P EST MOD 30 MIN: CPT | Performed by: INTERNAL MEDICINE

## 2022-02-09 PROCEDURE — G8484 FLU IMMUNIZE NO ADMIN: HCPCS | Performed by: INTERNAL MEDICINE

## 2022-02-09 PROCEDURE — G8427 DOCREV CUR MEDS BY ELIG CLIN: HCPCS | Performed by: INTERNAL MEDICINE

## 2022-02-09 PROCEDURE — 4040F PNEUMOC VAC/ADMIN/RCVD: CPT | Performed by: INTERNAL MEDICINE

## 2022-02-09 NOTE — PROGRESS NOTES
HISTORY  59-year-old gentleman with a history of dyslipidemia, diabetes, hypertension, essential tremor, and coronary disease returns for a follow-up visit. Angiographic assessment in July 2005 demonstrated 20% stenosis of the proximal LAD. He had negative stress testing in July 2019 but in August 2021 a dobutamine stress echo revealed 2 mm of flat ST segment depression with stress-induced global hypokinesis. Repeated efforts to arrange angiographic assessment had failed up until this time. The rest of his history reveals the fact that he has an essential tremor involving his chin and both upper extremities, has had acceptable blood pressure control, and is on statin therapy for his lipids with excellent numbers including an LDL of 47 and an HDL of 56. Recent lab work however has revealed a significantly elevated C-reactive protein and homocysteine. On return today he has had no ischemic symptoms. He and his wife have been very reluctant to consider angiographic assessment and possible stenting. A recent skin biopsy however failed to completely excised a basal cell carcinoma and anesthesia refused to cover a repeat procedure until cardiac clearance was obtained. He has refused the vaccine for COVID-19. PHYSICAL EXAM  On exam he carries 248 pounds in a 6 foot frame. Pressure is 132/84 with pulse of 63. Mildly kyphotic/mildly obese elderly gentleman. EOMs full, sclerae and conjunctiva normal. PERRLA. Mask in place. Trachea midline with no neck masses. Assessment of internal jugular veins reveals no elevation of central venous pressure at 45 degrees. Carotid pulses normal without delay or bruit. Thyroid normal to palpation. Chest exam reveals normal respiratory effort, no abnormal breath sounds and normal expiratory phase. No skin lesions seen. PMI normal. S1, S2 normal with a soft 1/6 systolic murmur heard along the left sternal border. Mild to moderate abdominal obesity.   Normal bowel sounds without palpable mass or bruit. No clubbing or acrocyanosis. No significant lower extremity edema or signs of venous insufficiency. General motor strength appears to be within normal limits. Normal range of motion with normal gait. Prominent tremor involving his chin and both upper extremities. Alert, oriented x 3, memory and cognition normal as reflected by history and conversation. EKG reveals sinus rhythm with a first-degree AV block. ASSESSMENT/PLAN:   1. Suspected coronary disease -given his multiple risk factors and the results of the stress echo have recommended angiographic assessment. The above described global hypokinesis clearly suggest the possibility of left main or triple vessel disease. Discussed the situation with him and his wife at great length. Continue propranolol. 2.  Dyslipidemia -well controlled. Continue Zocor  3. Hypertension -marginal control. Continue propranolol  4. Pandemic response -unvaccinated unfortunately. Discussed.

## 2022-03-23 ENCOUNTER — OFFICE VISIT (OUTPATIENT)
Dept: CARDIOLOGY CLINIC | Age: 82
End: 2022-03-23
Payer: MEDICARE

## 2022-03-23 VITALS
WEIGHT: 247 LBS | BODY MASS INDEX: 33.46 KG/M2 | SYSTOLIC BLOOD PRESSURE: 116 MMHG | DIASTOLIC BLOOD PRESSURE: 64 MMHG | HEIGHT: 72 IN | HEART RATE: 60 BPM

## 2022-03-23 DIAGNOSIS — I25.10 CORONARY ARTERY DISEASE INVOLVING NATIVE CORONARY ARTERY OF NATIVE HEART WITHOUT ANGINA PECTORIS: Primary | ICD-10-CM

## 2022-03-23 PROCEDURE — 4040F PNEUMOC VAC/ADMIN/RCVD: CPT | Performed by: INTERNAL MEDICINE

## 2022-03-23 PROCEDURE — G8427 DOCREV CUR MEDS BY ELIG CLIN: HCPCS | Performed by: INTERNAL MEDICINE

## 2022-03-23 PROCEDURE — G8484 FLU IMMUNIZE NO ADMIN: HCPCS | Performed by: INTERNAL MEDICINE

## 2022-03-23 PROCEDURE — 99213 OFFICE O/P EST LOW 20 MIN: CPT | Performed by: INTERNAL MEDICINE

## 2022-03-23 PROCEDURE — G8417 CALC BMI ABV UP PARAM F/U: HCPCS | Performed by: INTERNAL MEDICINE

## 2022-03-23 PROCEDURE — 1123F ACP DISCUSS/DSCN MKR DOCD: CPT | Performed by: INTERNAL MEDICINE

## 2022-03-23 PROCEDURE — 1036F TOBACCO NON-USER: CPT | Performed by: INTERNAL MEDICINE

## 2022-03-23 RX ORDER — ASPIRIN 81 MG/1
81 TABLET ORAL DAILY
COMMUNITY

## 2022-03-23 RX ORDER — CLOPIDOGREL BISULFATE 75 MG/1
75 TABLET ORAL DAILY
COMMUNITY

## 2022-03-23 NOTE — PROGRESS NOTES
HISTORY  76-year-old gentleman with a history of dyslipidemia, diabetes, hypertension, and coronary disease returns after recent intervention. He underwent angiographic assessment in 2005 with 20% proximal LAD stenosis documented. In August 2021 he had an abnormal dobutamine stress echo with impressive stress-induced extensive anterior apical hypokinesis. Because of the pandemic angiographic assessment which was recommended did not take place until February of this year, prompted by the need for surgical clearance for a follow-up surgical procedure on a basal cell carcinoma. At the time of the February 25 procedure he demonstrated an 80 to 90% proximal to mid LAD stenosis, a vessel that wrapped around the ventricular apex and 30 to 40% stenosis in the mid/distal right coronary. The former lesion was stented with a 2.5 x 15 mm drug-eluting device. On return today he relates no difficulties since his intervention though he was angina free prior. His lipids have been controlled with January values LDL 46, HDL 54, triglycerides 94. He has not been vaccinated for COVID-19. PHYSICAL EXAM  On exam he carries 247 pounds in a 6 foot frame. Pressure 116/64 with a pulse of 60. ASSESSMENT/PLAN:   1. Coronary disease -discussed with him the findings and the intervention performed, including the significance of the drug-eluting stent and the timing of anticoagulant coverage required. Advised him to talk to his plastic surgeon regarding the options -either proceed with the surgery while on aspirin and Plavix or postpone for a minimum 6 months. Continue the propranolol 80 mg twice daily along with the aspirin and Plavix. 2.  Hypertension -well controlled. Continue the propranolol. 3.  Lipidemia -good control. Continue the Zocor  4. Pandemic response -discussed at some length recommending vaccination.

## 2022-07-11 ENCOUNTER — TELEPHONE (OUTPATIENT)
Dept: PEDIATRICS | Facility: CLINIC | Age: 82
End: 2022-07-11

## 2022-07-11 ENCOUNTER — OFFICE VISIT (OUTPATIENT)
Dept: OTOLARYNGOLOGY | Facility: CLINIC | Age: 82
End: 2022-07-11

## 2022-07-11 VITALS
HEART RATE: 65 BPM | TEMPERATURE: 98 F | HEIGHT: 72 IN | RESPIRATION RATE: 20 BRPM | DIASTOLIC BLOOD PRESSURE: 60 MMHG | WEIGHT: 232 LBS | BODY MASS INDEX: 31.42 KG/M2 | SYSTOLIC BLOOD PRESSURE: 122 MMHG

## 2022-07-11 DIAGNOSIS — C44.311 BASAL CELL CARCINOMA (BCC) OF DORSUM OF NOSE: Primary | ICD-10-CM

## 2022-07-11 DIAGNOSIS — E11.65 TYPE 2 DIABETES MELLITUS WITH HYPERGLYCEMIA, UNSPECIFIED WHETHER LONG TERM INSULIN USE: ICD-10-CM

## 2022-07-11 DIAGNOSIS — D48.5 NEOPLASM OF UNCERTAIN BEHAVIOR OF SKIN: ICD-10-CM

## 2022-07-11 DIAGNOSIS — Z79.02 ANTIPLATELET OR ANTITHROMBOTIC LONG-TERM USE: ICD-10-CM

## 2022-07-11 PROCEDURE — 99203 OFFICE O/P NEW LOW 30 MIN: CPT | Performed by: OTOLARYNGOLOGY

## 2022-07-11 PROCEDURE — 11104 PUNCH BX SKIN SINGLE LESION: CPT | Performed by: OTOLARYNGOLOGY

## 2022-07-11 PROCEDURE — 88305 TISSUE EXAM BY PATHOLOGIST: CPT | Performed by: OTOLARYNGOLOGY

## 2022-07-11 RX ORDER — CLOPIDOGREL BISULFATE 75 MG/1
75 TABLET ORAL DAILY
COMMUNITY
Start: 2022-03-28

## 2022-07-11 RX ORDER — AZELASTINE 1 MG/ML
2 SPRAY, METERED NASAL
COMMUNITY
Start: 2022-04-12 | End: 2022-07-11

## 2022-07-11 RX ORDER — DIPHENHYDRAMINE HYDROCHLORIDE 25 MG/1
TABLET ORAL DAILY
COMMUNITY

## 2022-07-11 RX ORDER — BUDESONIDE 0.5 MG/2ML
0.5 INHALANT ORAL
COMMUNITY
Start: 2022-04-12 | End: 2022-07-11

## 2022-07-11 RX ORDER — CLARITHROMYCIN 500 MG/1
TABLET, COATED ORAL
COMMUNITY
Start: 2022-06-06 | End: 2022-11-23

## 2022-07-11 RX ORDER — ZINC GLUCONATE 50 MG
TABLET ORAL 2 TIMES DAILY
COMMUNITY

## 2022-07-11 NOTE — PROGRESS NOTES
Preprocedure diagnosis: Clinically suspicious for basal cell carcinoma of the left auricle    Post procedure diagnosis: Same    Procedure: Punch biopsy    Details:  Patient consent was obtained.  The skin was cleansed with alcohol.  The skin was infiltrated with 1 mL of 1% lidocaine with 1-100,000 epinephrine.  After approximately 10 minutes, a 3 millimeter punch biopsy was taken by placing circular motion on the biopsy tool, the skin was elevated and clipped with iris scissors.  The specimen was sent in formalin.  The skin was closed using a simple 5-0 fast absorbing gut suture.  Antibiotic ointment was placed over the biopsy site.  The patient tolerated the procedure with minimal discomfort.    Damien Titus MD  07/11/22  09:30 CDT

## 2022-07-11 NOTE — TELEPHONE ENCOUNTER
I have called and faxed clearance sheet to Dr. Pérez's office waiting on them to clear patient to hold.

## 2022-07-11 NOTE — PROGRESS NOTES
PRIMARY CARE PROVIDER: Irene Griffiths APRN  REFERRING PROVIDER: RICHARD Lima    Chief Complaint   Patient presents with   • Skin Lesion     Bcc of nasal dorsum left       Subjective   History of Present Illness:  Chepe Acevedo is a  81 y.o. male who presents for surgical consultation regarding a biopsy-proven basal cell carcinoma of the nose.  Prior to the biopsy, the lesion had the following characteristics:    Quality: small lump  Severity: mild  Duration: months  Timing: constant  Modifying Factors: none  Associated Signs & Symptoms: no nasal obstruction.    He also has a bleeding lesion of the left ear.  S/p excision by DYLAN OVERTON MD 2016.    His cutaneous cancer history was reviewed from Kavita Rothman's note:      He is on Plavix and 81mg aspirin.  Dr. Pérez at [facility] - s/p stent Feb 25th 2022.    Review of Systems:  Review of Systems   Constitutional: Negative for chills, fatigue, fever and unexpected weight change.   HENT: Negative for facial swelling.    Respiratory: Negative for cough, chest tightness and shortness of breath.    Cardiovascular: Negative for chest pain.   Musculoskeletal: Negative for neck pain.   Skin: Negative for color change.   Neurological: Negative for facial asymmetry.        Parkinson's   Hematological: Positive for adenopathy. Does not bruise/bleed easily.       Past History:  Past Medical History:   Diagnosis Date   • Arthritis    • Arthritis    • Asthma    • Basal cell carcinoma of skin of face    • BCC (basal cell carcinoma)     nasal dorsum   • Benign hypertrophy of prostate    • Cataract    • COPD (chronic obstructive pulmonary disease) (HCC)    • COPD (chronic obstructive pulmonary disease) (HCC)    • Diabetes (HCC)    • Diabetes mellitus (HCC)     TYPE 2   • Elevated cholesterol    • Hypertension    • Hypertension    • Parkinson's disease (HCC)    • Peripheral neuropathy    • Postural dizziness    • Skin cancer of face    • Sleep apnea in adult      Past  Surgical History:   Procedure Laterality Date   • ARM LESION/CYST EXCISION Left 6/28/2021    Procedure: WIDE EXCISION SKIN LESION LEFT SHOULDER;  Surgeon: Marisol Conner MD;  Location: [facility];  Service: General;  Laterality: Left;   • BASAL CELL CARCINOMA EXCISION  08/03/2014   • CATARACT EXTRACTION W/ INTRAOCULAR LENS  IMPLANT, BILATERAL Bilateral    • CHOLECYSTECTOMY     • EYE SURGERY      cataract   • HEAD/NECK LESION/CYST EXCISION Left 9/26/2016    Procedure: EXCISION LESION left ear and left temple BASAL CELL W/ F/S;  Surgeon: Ashok Harden MD;  Location: [facility] OR;  Service:    • OTHER SURGICAL HISTORY      Shoulder   • OTHER SURGICAL HISTORY      Thumb Surgery   • SINUS SURGERY     • TOTAL SHOULDER ARTHROPLASTY Right    • TRIGGER FINGER RELEASE      LEFT THUMB     Family History   Problem Relation Age of Onset   • Heart disease Father    • Arthritis Mother    • Cancer Sister      Social History     Tobacco Use   • Smoking status: Never Smoker   • Smokeless tobacco: Never Used   Vaping Use   • Vaping Use: Never used   Substance Use Topics   • Alcohol use: Never   • Drug use: No     Allergies:  Chlor-trimeton [chlorpheniramine], Latex, and Silicone    Current Outpatient Medications:   •  aspirin 81 MG tablet, Take 81 mg by mouth Daily. Delayed Release (DR/EC), Disp: , Rfl:   •  AZELASTINE HCL NA, into the nostril(s) as directed by provider As Needed. Azelastine, Disp: , Rfl:   •  Biotin 5 MG capsule, Take  by mouth., Disp: , Rfl:   •  budesonide (PULMICORT) 0.25 MG/2ML nebulizer solution, Take 0.25 mg by nebulization Daily., Disp: , Rfl:   •  Cholecalciferol (VITAMIN D3 PO), Take 5,000 Int'l Units by mouth daily., Disp: , Rfl:   •  Cholecalciferol 50 MCG (2000 UT) capsule, Take 1 capsule by mouth Daily., Disp: , Rfl:   •  CINNAMON PO, Take 1,000 mg by mouth daily., Disp: , Rfl:   •  citalopram (CeleXA) 10 MG tablet, Take 10 mg by mouth Daily., Disp: , Rfl:   •  clarithromycin (BIAXIN) 500 MG tablet,  TAKE 1 TABLET BY MOUTH EVERY 12 HOURS FOR 14 DAYS, Disp: , Rfl:   •  clopidogrel (PLAVIX) 75 MG tablet, Take 75 mg by mouth Daily., Disp: , Rfl:   •  Coenzyme Q10 (CO Q-10 PO), Take 400 mg by mouth daily., Disp: , Rfl:   •  Coenzyme Q10 (CoQ-10) 100 MG capsule, N/A, Disp: , Rfl:   •  COLLAGEN PO, Take 100 mg by mouth Daily., Disp: , Rfl:   •  GuaiFENesin (MUCINEX PO), Take 600 mg by mouth As Needed., Disp: , Rfl:   •  indapamide (LOZOL) 2.5 MG tablet, Take 2.5 mg by mouth daily., Disp: , Rfl:   •  JANUVIA 100 MG tablet, Take 100 mg by mouth daily., Disp: , Rfl:   •  metFORMIN (GLUCOPHAGE) 1000 MG tablet, Take 1,000 mg by mouth 2 (two) times a day with meals., Disp: , Rfl:   •  montelukast (SINGULAIR) 10 MG tablet, Take 10 mg by mouth. Take 1 tablet (10 mg) by oral route once daily in the evening, Disp: , Rfl:   •  Multiple Vitamins-Minerals (OCUVITE ADULT 50+ PO), Take 1 tablet by mouth Daily., Disp: , Rfl:   •  Omega-3 Fatty Acids (OMEGA 3 PO), Take  by mouth Daily. Omega 3 350-400 mg oral capsule, Disp: , Rfl:   •  primidone (MYSOLINE) 50 MG tablet, Take 50 mg by mouth Daily., Disp: , Rfl:   •  propranolol (INDERAL) 80 MG tablet, Take 80 mg by mouth 2 (two) times a day., Disp: , Rfl:   •  RESVERATROL PO, Take 100 mg by mouth daily., Disp: , Rfl:   •  Saw Palmetto, Serenoa repens, (SAW PALMETTO PO), Take 450 mg by mouth Daily., Disp: , Rfl:   •  simvastatin (ZOCOR) 40 MG tablet, Take 40 mg by mouth Every Night., Disp: , Rfl:   •  Turmeric 500 MG capsule, Take 500 mg by mouth daily., Disp: , Rfl:   •  zinc gluconate 50 MG tablet, Take  by mouth Daily., Disp: , Rfl:     Objective     Vital Signs:  Temp:  [98 °F (36.7 °C)] 98 °F (36.7 °C)  Heart Rate:  [65] 65  Resp:  [20] 20  BP: (122)/(60) 122/60    Physical Exam:  Physical Exam  Vitals and nursing note reviewed.   Constitutional:       General: He is not in acute distress.     Appearance: He is well-developed. He is not diaphoretic.   HENT:      Head: Normocephalic  and atraumatic.      Right Ear: External ear normal.      Left Ear: External ear normal.      Nose: Nose normal. No septal deviation.     Eyes:      General: No scleral icterus.        Right eye: No discharge.         Left eye: No discharge.      Conjunctiva/sclera: Conjunctivae normal.      Pupils: Pupils are equal, round, and reactive to light.   Neck:      Thyroid: No thyromegaly.      Vascular: No JVD.      Trachea: No tracheal deviation.   Pulmonary:      Effort: Pulmonary effort is normal.      Breath sounds: No stridor.   Musculoskeletal:         General: No deformity. Normal range of motion.      Cervical back: Normal range of motion and neck supple.   Lymphadenopathy:      Cervical: No cervical adenopathy.   Skin:     General: Skin is warm and dry.      Coloration: Skin is not pale.      Findings: No erythema or rash.   Neurological:      Mental Status: He is alert and oriented to person, place, and time.      Cranial Nerves: No cranial nerve deficit.      Coordination: Coordination normal.   Psychiatric:         Speech: Speech normal.         Behavior: Behavior normal. Behavior is cooperative.         Thought Content: Thought content normal.         Judgment: Judgment normal.         Data Review:  I have personally reviewed the pathology report demonstrating a sclerosing and infiltrative basal cell carcinoma of the nasal dorsum:      Operative plan:    Linear closure vs East-West flap    Assessment   1. Basal cell carcinoma (BCC) of dorsum of nose        Plan     I have offered excision of the basal cell carcinoma of the nose with  frozen section analysis and likely  East-West flap closure closure in the office under local anesthesia.  We will call with pathology results on the left ear and remove at the same time if this is cancer.  On Plavix -- will check with Dr. Pérez to see if this can be held.    Discussion of skin lesion. Discussed risks, benefits, alternatives, and possible complications of excision  of the skin lesion with reconstruction utilizing local tissue rearrangement, full-thickness skin grafting, or local interpolated flaps. Risks include, but are not limited too: bleeding, infection, hematoma, recurrence, need for additional procedures, flap failure, cosmetic deformity. Patient understands risks and would like to proceed with surgery.     My findings and recommendations were discussed and questions were answered.     Damien Titus MD  07/11/22  09:08 CDT

## 2022-07-12 ENCOUNTER — TELEPHONE (OUTPATIENT)
Dept: CARDIOLOGY CLINIC | Age: 82
End: 2022-07-12

## 2022-07-12 NOTE — TELEPHONE ENCOUNTER
Date: TBD    Cardiologist: Dr. Melissa Ramirez    Procedure: Cancer on nose removed     Surgeon: Dr. Roxana Sanches    Last Office Visit: 3-23-22    Reason for office visit and medical concerns addressed at this office visit: CAD, DM, HTN, dyslipidemia    Testing Performed and Date of Service:  EKG 2-9-22  Echo 8-18-21    Does the patient have a stent? If so, what type? Not in last year     Current Medications: Plavix, ASA, celexa, pulmicort, mysoline, metformin, biotin, zocor, inderal, singulair, januvia    Is the patient currently taking an anticoagulant?  If so, what is the diagnosis the patient has been given to warrant the need for the anticoagulant? plavix and ASA    Additional Notes: med hold on plavix and ASA

## 2022-07-13 LAB
CYTO UR: NORMAL
LAB AP CASE REPORT: NORMAL
LAB AP CLINICAL INFORMATION: NORMAL
Lab: NORMAL
PATH REPORT.FINAL DX SPEC: NORMAL
PATH REPORT.GROSS SPEC: NORMAL

## 2022-07-18 ENCOUNTER — TELEPHONE (OUTPATIENT)
Dept: OTOLARYNGOLOGY | Facility: CLINIC | Age: 82
End: 2022-07-18

## 2022-07-18 NOTE — TELEPHONE ENCOUNTER
I called and discussed the biopsy results with his ear.  This was a basal cell carcinoma and the margins were reportedly clear.  I find it hard to believe, as the lesion was larger than the biopsy itself.  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  His cardiologist would not hold his blood thinners until September.  We will reschedule his procedures for when it is appropriate to hold his blood thinners.  Dr. Acevedo and his wife are in agreement.      Electronically signed by Damien Titus MD, 07/18/22, 1:12 PM CDT.

## 2022-09-21 ENCOUNTER — PROCEDURE VISIT (OUTPATIENT)
Dept: OTOLARYNGOLOGY | Facility: CLINIC | Age: 82
End: 2022-09-21

## 2022-09-21 VITALS
HEART RATE: 65 BPM | DIASTOLIC BLOOD PRESSURE: 74 MMHG | BODY MASS INDEX: 31.42 KG/M2 | TEMPERATURE: 98 F | RESPIRATION RATE: 20 BRPM | HEIGHT: 72 IN | WEIGHT: 232 LBS | SYSTOLIC BLOOD PRESSURE: 130 MMHG

## 2022-09-21 DIAGNOSIS — C44.219 BASAL CELL CARCINOMA, EAR, LEFT: ICD-10-CM

## 2022-09-21 DIAGNOSIS — C44.311 BASAL CELL CARCINOMA (BCC) OF DORSUM OF NOSE: Primary | ICD-10-CM

## 2022-09-21 PROCEDURE — 15260 FTH/GFT FR N/E/E/L 20 SQCM/<: CPT | Performed by: OTOLARYNGOLOGY

## 2022-09-21 PROCEDURE — 11642 EXC F/E/E/N/L MAL+MRG 1.1-2: CPT | Performed by: OTOLARYNGOLOGY

## 2022-09-21 PROCEDURE — 88331 PATH CONSLTJ SURG 1 BLK 1SPC: CPT | Performed by: PATHOLOGY

## 2022-09-21 PROCEDURE — 88305 TISSUE EXAM BY PATHOLOGIST: CPT | Performed by: OTOLARYNGOLOGY

## 2022-09-21 PROCEDURE — 13151 CMPLX RPR E/N/E/L 1.1-2.5 CM: CPT | Performed by: OTOLARYNGOLOGY

## 2022-09-21 PROCEDURE — 14060 TIS TRNFR E/N/E/L 10 SQ CM/<: CPT | Performed by: OTOLARYNGOLOGY

## 2022-09-21 NOTE — PATIENT INSTRUCTIONS
[facility], Post-Procedure Instructions (Nose):    Protect the incisions from sunlight. Sunlight to the incisions will cause permanent pigmentation to the incision line and make the incision more noticeable. After the incision has reepithelialized (typically 2-3 weeks after the procedure), you may begin to use sunscreen with an SPF of 15 or greater    For the first week after your procedure, apply a thin coat of Vaseline to the incision 3-4 times daily.  Starting the second week, use a silicone-based wound cream to the incisions to optimize the end result. Apply topically twice daily, or as directed, to help optimize wound healing and decrease redness.  Should the area need to be cleaned, gently apply peroxide on a Q-tip to the area.  Do not clean the area more than once daily with peroxide, as it can delay wound healing.    Avoid getting water on the surgical site for 3 days.  On day 4, you may briefly get the surgical site with clean water, but avoid soapy water to the area for 1 week.        WOUND CARE INSTRUCTIONS (Ear):    1. Please keep your dressing dry and intact. You may need to take a bath instead of a shower. Remember that a wet bandage is worse than none at all; moisture attracts bacteria and will increase your risk of superficial skin infections.  The yellow dressing in in place to apply pressure to the graft and to help prevent a fluid collection to develop between the graft and the underlying tissue.    2.  To keep the dressing from drying out, apply a thin coat of Vaseline or mupirocin/bactroban ointment to the dressing twice daily.  To the area where the graft was taken from, apply Vaseline to the area four times daily.    3. After your first follow-up visit, your dressing will be removed.      4. Do not take aspirin (or aspirin-containing products), ibuprofen, Motrin, Aleve, or other non-steroidal anti-inflammatory (NSAID) products unless instructed to do so by your doctor.   These products increase the tendency to bleed and may increase the chance of bruising, blood collections, and poor healing.  You may take Tylenol as an alternative to the pain medication prescribed.      5. Do not place makeup on the incision line or graft site until they have completely healed (generally 3 weeks).  Makeup may cause permanent pigmentation and tattooing of the incision.    6. Protect the incisions from sunlight.  Sunlight to the incisions will cause the incision to become pigmented and red.  Once the incisions have healed (generally 3 weeks) you may apply sunscreen.  It is important that you continue to protect the incisions from sunlight for 12 months following surgery.                 CONTACT INFORMATION:  The main office phone number is [phone]. For emergencies after hours and on weekends, this number will convert over to our answering service and the on call provider will answer. Please try to keep non emergent phone calls/ questions to office hours 9am-5pm Monday through Friday.     [employer]  As an alternative, you can sign up and use the Epic MyChart system for more direct and quicker access for non emergent questions/ problems.  [employer] allows you to send messages to your doctor, view your test results, renew your prescriptions, schedule appointments, and more. To sign up, go to [employer] and click on the Sign Up Now link in the New User? box. Enter your [employer] Activation Code exactly as it appears below along with the last four digits of your Social Security Number and your Date of Birth () to complete the sign-up process. If you do not sign up before the expiration date, you must request a new code.    [employer] Activation Code: Activation code not generated  Current [employer] Status: Active    If you have questions, you can email [email or web address] or call [phone] to talk to our [employer] staff. Remember, [employer] is NOT to be used for urgent  needs. For medical emergencies, dial 911.

## 2022-09-21 NOTE — PROGRESS NOTES
PATIENT NAME:  Chepe Acevedo    DATE: 09/21/22    PREOPERATIVE DIAGNOSIS:  1) Basal cell carcinoma left nasal supratip   2) Basal cell carcinoma left auricular helix    POSTOPERATIVE DIAGNOSIS:  1) Basal cell carcinoma left nasal supratip   2) Basal cell carcinoma left auricular helix     PROCEDURE:  1) excision of malignant neoplasm skin of nose, 10 mm x 10 mm    2) excision of malignant neoplasm skin of left ear, 1.3 cm x 1.3 cm    3) local tissue rearrangement (East-West flap) of nose, 2.8 cm x 1.0 cm    4) full-thickness skin graft from nose to left ear, 1.3 cm x 1.3 cm    SURGEON:  Damien Titus MD, FACS    FACILITY: [facility] Office Procedure Room    ANESTHESIA:  Local with 5 cc 1% lidocaine and 1:100,000 epinephrine    DICTATED BY:  Damien Titus MD, FACS    IVF: None    IMPLANTS: None    DRAINS: None    SPECIMENS: 1) Basal cell carcinoma left nasal supratip, excision:-Frozen   2) Basal cell carcinoma left auricular helix, stitch at 12:00-frozen    EBL: 80 cc    COMPLICATIONS: None apparent    INDICATIONS FOR SURGERY: Mr. Acevedo initially presented with a biopsy-proven basal cell carcinoma of the left nasal supratip.  He opted for surgical excision and reconstruction.  He also had a lesion of the left auricular helix that we biopsied and was consistent with basal cell carcinoma.  The pathology report demonstrated that the left ear margins were clear, however, there was clearly additional tumor present.    OPERATIVE FINDINGS:     Nose:  Lesion: 3 mm x 3 mm  Margins: 3.5 mm  Defect: 10 mm x 10 mm  Flap and Defect: 28 mm x 10 mm  Depth: Through dermis    Left ear:  Lesion: 5 mm x 5 mm  Margins: 4 mm  Defect: 1.3 cm x 1.3 cm  Graft: 1.3 cm x 1.3 cm  Depth: Through dermis    OPERATIVE DETAILS:       After patient verification consent material was reviewed, the patient was taken to the procedure room and laid supine on the procedure table.  The skin at each area was cleansed with alcohol, the areas  were marked, the patient was then handed a mirror where we reviewed the intended excision, and verified the consent.  This served as the formal timeout procedure.  The skin was  infiltrated with 1% lidocaine with 1-100,000 epinephrine.    After approximately 15 minutes, the skin was tested for anesthesia and deemed appropriate.  The skin was cleansed with Hibiclense and the patient was draped with sterile towels.    The nasal lesion was addressed first:  The skin surrounding the lesion was incised using a 15 blade and undermined in the submuscular plane.   Hemostasis was obtained with bipolar cautery set at 15.  The lesion was oriented with a stitch at 12 o'clock and sent for frozen tissue analysis.    The skin of the left auricular helix was incised lysing a fresh 15 blade circular fashion.  The skin was undermined in the subcutaneous plane, oriented with a stitch at 12:00 and sent for frozen section analysis.  Hemostasis was obtained with bipolar cautery set at 15.    Frozen section pathology demonstrated clear peripheral and deep margins for each specimen.    An East-West flap was designed.  The skin was then incised using a 15 blade, and the flap was undermined in the submuscular plane.    Hemostasis was again obtained with bipolar cautery set at 15.      The nasal defect was closed,  The flap was rotated into place, the inferior and superior standing cutaneous deformities were incised and kept on the back table for skin grafting of the left ear.   The skin was then closed utilizing 5-0 undyed Monocryl suture in buried fashion.  The overlying skin was closed with 6-0 Prolene suture in running, locking fashion.      The left ear defect was closed:  The skin graft was thinned and the back table using curved iris scissors and trimmed for inset.  This is inset using 4-0 silk sutures with the ends left long for the bolster.  I then placed a running locking 5-0 fast absorbing gut and tacking sutures of 5-0 fast  absorbing gut to the perichondrium.  A Xeroform bolster was then placed and affixed using the silk sutures.    Antibiotic ointment was placed to the incisions and flap.    DISPOSITION:  The procedures were completed without complication and tolerated well.  The patient was released in the company of his wife to return home in satisfactory condition.  A follow-up appointment has been scheduled, routine post-op medications prescribed (if required), and post-op instructions were given to the responsible party.           Damien Titus MD, FACS  Board Certified Facial Plastic and Reconstructive Surgery  Board Certified Otolaryngology -- Head and Neck   Electronically signed by Damien Titus MD, 09/21/22, 10:30 AM CDT.

## 2022-09-29 ENCOUNTER — OFFICE VISIT (OUTPATIENT)
Dept: OTOLARYNGOLOGY | Facility: CLINIC | Age: 82
End: 2022-09-29

## 2022-09-29 DIAGNOSIS — Z48.02 VISIT FOR SUTURE REMOVAL: Primary | ICD-10-CM

## 2022-09-29 PROCEDURE — 99024 POSTOP FOLLOW-UP VISIT: CPT | Performed by: OTOLARYNGOLOGY

## 2022-09-29 NOTE — PROGRESS NOTES
Patient here today for one week post-op visit after exc skin cancer of nose and left ear. Patient doing well and wound healing great. Patient was given instructions on wound care and will follow up in 6 weeks for wound check and will call in between time if he has any questions or concerns.

## 2022-09-30 LAB
CYTO UR: NORMAL
CYTO UR: NORMAL
LAB AP CASE REPORT: NORMAL
LAB AP CASE REPORT: NORMAL
LAB AP CLINICAL INFORMATION: NORMAL
LAB AP CLINICAL INFORMATION: NORMAL
Lab: NORMAL
PATH REPORT.FINAL DX SPEC: NORMAL
PATH REPORT.FINAL DX SPEC: NORMAL
PATH REPORT.GROSS SPEC: NORMAL
PATH REPORT.GROSS SPEC: NORMAL

## 2022-10-19 ENCOUNTER — OFFICE VISIT (OUTPATIENT)
Dept: CARDIOLOGY CLINIC | Age: 82
End: 2022-10-19
Payer: MEDICARE

## 2022-10-19 VITALS
HEIGHT: 72 IN | BODY MASS INDEX: 32.91 KG/M2 | WEIGHT: 243 LBS | HEART RATE: 62 BPM | DIASTOLIC BLOOD PRESSURE: 78 MMHG | SYSTOLIC BLOOD PRESSURE: 118 MMHG

## 2022-10-19 DIAGNOSIS — I25.10 CORONARY ARTERY DISEASE INVOLVING NATIVE CORONARY ARTERY OF NATIVE HEART WITHOUT ANGINA PECTORIS: Primary | ICD-10-CM

## 2022-10-19 PROCEDURE — G8427 DOCREV CUR MEDS BY ELIG CLIN: HCPCS | Performed by: INTERNAL MEDICINE

## 2022-10-19 PROCEDURE — 1123F ACP DISCUSS/DSCN MKR DOCD: CPT | Performed by: INTERNAL MEDICINE

## 2022-10-19 PROCEDURE — 99214 OFFICE O/P EST MOD 30 MIN: CPT | Performed by: INTERNAL MEDICINE

## 2022-10-19 PROCEDURE — 1036F TOBACCO NON-USER: CPT | Performed by: INTERNAL MEDICINE

## 2022-10-19 PROCEDURE — 93000 ELECTROCARDIOGRAM COMPLETE: CPT | Performed by: INTERNAL MEDICINE

## 2022-10-19 PROCEDURE — G8417 CALC BMI ABV UP PARAM F/U: HCPCS | Performed by: INTERNAL MEDICINE

## 2022-10-19 PROCEDURE — G8484 FLU IMMUNIZE NO ADMIN: HCPCS | Performed by: INTERNAL MEDICINE

## 2022-10-19 RX ORDER — CEPHRADINE 500 MG
CAPSULE ORAL
COMMUNITY

## 2022-10-19 RX ORDER — INDAPAMIDE 2.5 MG/1
2.5 TABLET, FILM COATED ORAL EVERY MORNING
COMMUNITY

## 2022-10-19 RX ORDER — IBUPROFEN 200 MG
1 CAPSULE ORAL DAILY
COMMUNITY

## 2022-10-19 RX ORDER — AMPICILLIN TRIHYDRATE 250 MG
CAPSULE ORAL DAILY
COMMUNITY

## 2022-10-19 RX ORDER — FEXOFENADINE HCL 180 MG/1
180 TABLET ORAL DAILY
COMMUNITY

## 2022-10-19 RX ORDER — PERPHENAZINE/AMITRIPTYLINE HCL 4MG-10MG
TABLET ORAL DAILY
COMMUNITY

## 2022-10-19 RX ORDER — MULTIVIT WITH MINERALS/LUTEIN
1000 TABLET ORAL DAILY
COMMUNITY

## 2022-10-19 RX ORDER — MULTIVITAMIN WITH IRON
250 TABLET ORAL DAILY
COMMUNITY

## 2022-10-19 NOTE — PROGRESS NOTES
HISTORY  79-year-old gentleman with a history of dyslipidemia, diabetes, hypertension, and coronary disease returns for a follow-up visit. He underwent first angiographic assessment 2005 with a 20% proximal LAD stenosis. An abnormal dobutamine stress echo was obtained in August 2021 with delayed angiographic assessment in February of this year at which time he was shown to have a 90% proximal to mid LAD stenosis, and a 30 to 40% lesion in his mid/distal right coronary. The LAD stenosis was stented with a 2.5 x 15 mm drug-eluting device. Note the fact that he had no angina prior to this intervention. His blood pressure has been controlled as have his lipids with January values LDL 46, HDL 54, triglycerides 94. 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 He complains of some bruising with the Plavix and is more troubled with his essential tremor. He is investigating a new noninvasive procedure used to address this problem. He has not been vaccinated for COVID-19 but has had the infection. PHYSICAL EXAM  On exam he carries 243 pounds in a 6 foot frame. Pressure is 118/78 pulse of 62. EOMs full, sclerae and conjunctiva normal. PERRLA. Mask in place. Trachea midline with no neck masses. Assessment of internal jugular veins reveals no elevation of central venous pressure at 45 degrees. Carotid pulses normal without delay or bruit. Thyroid normal to palpation. Chest exam reveals normal respiratory effort, no abnormal breath sounds and normal expiratory phase. No skin lesions seen. PMI normal. S1, S2 normal without murmur or marlen or click. Mild abdominal obesity. Normal bowel sounds without palpable mass or bruit. No clubbing or acrocyanosis. No significant lower extremity edema or signs of venous insufficiency. General motor strength appears to be within normal limits. MR noted. Normal range of motion with normal gait. Alert, oriented x 3, memory and cognition normal as reflected by history and conversation. EKG reveals normal sinus rhythm with a minor intraventricular conduction delay. 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 We will likely repeat stress testing in about a year. Continue Plavix, aspirin, and propranolol. Dyslipidemia -well-controlled as noted above. Continue simvastatin.   Pandemic response -prior infection, unvaccinated

## 2022-11-10 ENCOUNTER — OFFICE VISIT (OUTPATIENT)
Dept: OTOLARYNGOLOGY | Facility: CLINIC | Age: 82
End: 2022-11-10

## 2022-11-10 VITALS
BODY MASS INDEX: 31.42 KG/M2 | HEART RATE: 65 BPM | TEMPERATURE: 98 F | SYSTOLIC BLOOD PRESSURE: 136 MMHG | DIASTOLIC BLOOD PRESSURE: 78 MMHG | RESPIRATION RATE: 20 BRPM | HEIGHT: 72 IN | WEIGHT: 232 LBS

## 2022-11-10 DIAGNOSIS — Z79.02 ANTIPLATELET OR ANTITHROMBOTIC LONG-TERM USE: ICD-10-CM

## 2022-11-10 DIAGNOSIS — D48.5 NEOPLASM OF UNCERTAIN BEHAVIOR OF SKIN: Primary | ICD-10-CM

## 2022-11-10 PROCEDURE — 11102 TANGNTL BX SKIN SINGLE LES: CPT | Performed by: OTOLARYNGOLOGY

## 2022-11-10 PROCEDURE — 99213 OFFICE O/P EST LOW 20 MIN: CPT | Performed by: OTOLARYNGOLOGY

## 2022-11-10 PROCEDURE — 11103 TANGNTL BX SKIN EA SEP/ADDL: CPT | Performed by: OTOLARYNGOLOGY

## 2022-11-10 PROCEDURE — 88305 TISSUE EXAM BY PATHOLOGIST: CPT | Performed by: OTOLARYNGOLOGY

## 2022-11-10 NOTE — PROGRESS NOTES
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  The skin was cleansed with alcohol.  The skin was infiltrated with 1 mL of 1% lidocaine with 1-100,000 epinephrine.  After approximately 10 minutes, a Dermablade was used to perform the shave biopsy. The specimen was lifted off the defect and sent in formalin for permanent section pathology. Pressure was applied to the wound,followed by silver nitrate. A Band-Aid was placed over the biopsy site.  The patient tolerated the procedure with minimal discomfort.      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  The skin was cleansed with alcohol.  The skin was infiltrated with 1 mL of 1% lidocaine with 1-100,000 epinephrine.  After approximately 10 minutes, a Dermablade was used to perform the shave biopsy. The specimen was lifted off the defect and sent in formalin for permanent section pathology. Pressure was applied to the wound,followed by silver nitrate. A Band-Aid was placed over the biopsy site.  The patient tolerated the procedure with minimal discomfort.      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  The skin was cleansed with alcohol.  The skin was infiltrated with 1 mL of 1% lidocaine with 1-100,000 epinephrine.  After approximately 10 minutes, a Dermablade was used to perform the shave biopsy. The specimen was lifted off the defect and sent in formalin for permanent section pathology. Pressure  was applied to the wound,followed by silver nitrate. A Band-Aid was placed over the biopsy site.  The patient tolerated the procedure with minimal discomfort.      Damien Titus MD  11/10/22  10:03 CST

## 2022-11-10 NOTE — PROGRESS NOTES
PRIMARY CARE PROVIDER: Irene Griffiths APRN  REFERRING PROVIDER: No ref. provider found    Chief Complaint   Patient presents with   • Follow-up     Bcc f/u       Subjective   History of Present Illness:  Chepe Acevedo is a  82 y.o. male who presents for consultation regarding 3 skin lesion of the right temple/cheek.  The lesion has the following characteristics:    Location: Right temple/cheek  Quality: Raised, irritated lesions  Severity: Moderate  Duration: Months  Timing: constant  Modifying Factors: None  Associated Signs & Symptoms: None    He is on Plavix.    He most recently underwent  excision of a basal cell carcinoma of the left nasal supratip with the east-West flap reconstruction, and excision of basal cell carcinoma of the left auricular helix with full-thickness skin graft reconstruction on September 21, 2022.  Frozen section pathology was demonstrated clear margins, however, permanent section later revealed focal abutment of the 12:00 margin of the nose.  The skin was then utilized as a skin graft, for the left ear.  Part of the skin graft had been trimmed, however.      Review of Systems:  Review of Systems   Constitutional: Negative for chills, fatigue, fever and unexpected weight change.   HENT: Negative for facial swelling.    Respiratory: Negative for cough, chest tightness and shortness of breath.    Cardiovascular: Negative for chest pain.   Musculoskeletal: Negative for neck pain.   Skin: Negative for color change.   Neurological: Negative for facial asymmetry.   Hematological: Negative for adenopathy. Does not bruise/bleed easily.       Past History:  Past Medical History:   Diagnosis Date   • Arthritis    • Arthritis    • Asthma    • Basal cell carcinoma of skin of face    • BCC (basal cell carcinoma)     nasal dorsum   • Benign hypertrophy of prostate    • Cataract    • COPD (chronic obstructive pulmonary disease) (HCC)    • COPD (chronic obstructive pulmonary disease) (HCC)    •  Diabetes (HCC)    • Diabetes mellitus (HCC)     TYPE 2   • Elevated cholesterol    • Hypertension    • Hypertension    • Parkinson's disease (HCC)    • Peripheral neuropathy    • Postural dizziness    • Skin cancer of face    • Sleep apnea in adult      Past Surgical History:   Procedure Laterality Date   • ARM LESION/CYST EXCISION Left 6/28/2021    Procedure: WIDE EXCISION SKIN LESION LEFT SHOULDER;  Surgeon: Marisol Conner MD;  Location: [facility] OR;  Service: General;  Laterality: Left;   • BASAL CELL CARCINOMA EXCISION  08/03/2014   • CATARACT EXTRACTION W/ INTRAOCULAR LENS  IMPLANT, BILATERAL Bilateral    • CHOLECYSTECTOMY     • EYE SURGERY      cataract   • HEAD/NECK LESION/CYST EXCISION Left 9/26/2016    Procedure: EXCISION LESION left ear and left temple BASAL CELL W/ F/S;  Surgeon: Ashok Harden MD;  Location: [facility] OR;  Service:    • OTHER SURGICAL HISTORY      Shoulder   • OTHER SURGICAL HISTORY      Thumb Surgery   • SINUS SURGERY     • TOTAL SHOULDER ARTHROPLASTY Right    • TRIGGER FINGER RELEASE      LEFT THUMB     Family History   Problem Relation Age of Onset   • Heart disease Father    • Arthritis Mother    • Cancer Sister      Social History     Tobacco Use   • Smoking status: Never   • Smokeless tobacco: Never   Vaping Use   • Vaping Use: Never used   Substance Use Topics   • Alcohol use: Never   • Drug use: No     Allergies:  Chlor-trimeton [chlorpheniramine], Ethylenediamine, Latex, Topiramate, Clortermine, and Silicone    Current Outpatient Medications:   •  aspirin 81 MG tablet, Take 81 mg by mouth Daily. Delayed Release (DR/EC), Disp: , Rfl:   •  AZELASTINE HCL NA, into the nostril(s) as directed by provider As Needed. Azelastine, Disp: , Rfl:   •  Biotin 5 MG capsule, Take  by mouth., Disp: , Rfl:   •  budesonide (PULMICORT) 0.25 MG/2ML nebulizer solution, Take 0.25 mg by nebulization Daily., Disp: , Rfl:   •  Cholecalciferol (VITAMIN D3 PO), Take 5,000 Int'l Units by mouth daily.,  Disp: , Rfl:   •  Cholecalciferol 50 MCG (2000 UT) capsule, Take 1 capsule by mouth Daily., Disp: , Rfl:   •  CINNAMON PO, Take 1,000 mg by mouth daily., Disp: , Rfl:   •  citalopram (CeleXA) 10 MG tablet, Take 10 mg by mouth Daily., Disp: , Rfl:   •  clarithromycin (BIAXIN) 500 MG tablet, TAKE 1 TABLET BY MOUTH EVERY 12 HOURS FOR 14 DAYS, Disp: , Rfl:   •  clopidogrel (PLAVIX) 75 MG tablet, Take 75 mg by mouth Daily., Disp: , Rfl:   •  Coenzyme Q10 (CoQ-10) 100 MG capsule, N/A, Disp: , Rfl:   •  COLLAGEN PO, Take 100 mg by mouth Daily., Disp: , Rfl:   •  GuaiFENesin (MUCINEX PO), Take 600 mg by mouth As Needed., Disp: , Rfl:   •  indapamide (LOZOL) 2.5 MG tablet, Take 2.5 mg by mouth daily., Disp: , Rfl:   •  JANUVIA 100 MG tablet, Take 100 mg by mouth daily., Disp: , Rfl:   •  metFORMIN (GLUCOPHAGE) 1000 MG tablet, Take 1,000 mg by mouth 2 (two) times a day with meals., Disp: , Rfl:   •  montelukast (SINGULAIR) 10 MG tablet, Take 10 mg by mouth. Take 1 tablet (10 mg) by oral route once daily in the evening, Disp: , Rfl:   •  Multiple Vitamins-Minerals (OCUVITE ADULT 50+ PO), Take 1 tablet by mouth Daily., Disp: , Rfl:   •  Omega-3 Fatty Acids (OMEGA 3 PO), Take  by mouth Daily. Omega 3 350-400 mg oral capsule, Disp: , Rfl:   •  primidone (MYSOLINE) 50 MG tablet, Take 50 mg by mouth Daily., Disp: , Rfl:   •  propranolol (INDERAL) 80 MG tablet, Take 80 mg by mouth 2 (two) times a day., Disp: , Rfl:   •  RESVERATROL PO, Take 100 mg by mouth daily., Disp: , Rfl:   •  Saw Palmetto, Serenoa repens, (SAW PALMETTO PO), Take 450 mg by mouth Daily., Disp: , Rfl:   •  simvastatin (ZOCOR) 40 MG tablet, Take 40 mg by mouth Every Night., Disp: , Rfl:   •  Turmeric 500 MG capsule, Take 500 mg by mouth daily., Disp: , Rfl:   •  zinc gluconate 50 MG tablet, Take  by mouth Daily., Disp: , Rfl:     Objective     Vital Signs:  Temp:  [98 °F (36.7 °C)] 98 °F (36.7 °C)  Heart Rate:  [65] 65  Resp:  [20] 20  BP: (136)/(78)  136/78    Physical Exam:  Physical Exam  Vitals and nursing note reviewed.   Constitutional:       General: He is not in acute distress.     Appearance: He is well-developed. He is not diaphoretic.   HENT:      Head: Normocephalic and atraumatic.        Right Ear: External ear normal.      Left Ear: External ear normal.      Ears:        Nose: Nose normal.   Eyes:      General: No scleral icterus.        Right eye: No discharge.         Left eye: No discharge.      Conjunctiva/sclera: Conjunctivae normal.      Pupils: Pupils are equal, round, and reactive to light.   Neck:      Thyroid: No thyromegaly.      Vascular: No JVD.      Trachea: No tracheal deviation.   Pulmonary:      Effort: Pulmonary effort is normal.      Breath sounds: No stridor.   Musculoskeletal:         General: No deformity. Normal range of motion.      Cervical back: Normal range of motion and neck supple.   Lymphadenopathy:      Cervical: No cervical adenopathy.   Skin:     General: Skin is warm and dry.      Coloration: Skin is not pale.      Findings: No erythema or rash.   Neurological:      Mental Status: He is alert and oriented to person, place, and time.      Cranial Nerves: No cranial nerve deficit.      Coordination: Coordination normal.   Psychiatric:         Speech: Speech normal.         Behavior: Behavior normal. Behavior is cooperative.         Thought Content: Thought content normal.         Judgment: Judgment normal.         Data review:          Assessment   1. Neoplasm of uncertain behavior of skin    2. Antiplatelet or antithrombotic long-term use        Plan     I have offered biopsy of the lesions today in the office.  We will call him with the pathology result and plan.  He sees Dr. Harden next month.    The risks, benefits, and alternatives of the procedure including but not limited to pain, scarring, bleeding, infection, persistent symptoms, and risks of the anesthesia were discussed full with the patient. Need for  further therapy, depending on the pathologic outcome, was discussed. Questions were answered. No guarantees were made or implied.        Discussion of skin lesion. Discussed risks, benefits, alternatives, and possible complications of excision of the skin lesion with reconstruction utilizing local tissue rearrangement, full-thickness skin grafting, or local interpolated flaps. Risks include, but are not limited too: bleeding, infection, hematoma, recurrence, need for additional procedures, flap failure, cosmetic deformity. Patient understands risks and would like to proceed with surgery.     My findings and recommendations were discussed and questions were answered.     Damien Titus MD  11/10/22  10:38 CST

## 2022-11-15 ENCOUNTER — TELEPHONE (OUTPATIENT)
Dept: OTOLARYNGOLOGY | Facility: CLINIC | Age: 82
End: 2022-11-15

## 2022-11-15 ENCOUNTER — TELEPHONE (OUTPATIENT)
Dept: CARDIOLOGY CLINIC | Age: 82
End: 2022-11-15

## 2022-11-15 NOTE — TELEPHONE ENCOUNTER
Patient has been contacted with results of his biopsies , Scc of right temple x2 . We will put in orders for surgery in the OR and will contact patient with day and time, as well with clearance to hold his Plavix once  I speak with Dr. Mahesh Pérez.

## 2022-11-15 NOTE — TELEPHONE ENCOUNTER
Date: TBD    Cardiologist: Dr. Laureen Bragg    Procedure: Removal of skin ca    Surgeon: Dr. Jurgen Hurtado     Last Office Visit: 10-19-22    Reason for office visit and medical concerns addressed at this office visit: CAD, HTN, hyperlipidemia, DM    Testing Performed and Date of Service:  EKG 10-19-22    Does the patient have a stent? If so, what type? Not in last year     Current Medications: lozol, probiotic, vit C, calcitrate, cinnamon, coconut oil, mg, plavix, ASA, celexa, pulmicort, mysoline, metformin, zocor, inderal, singulair, januvia     Is the patient currently taking an anticoagulant? If so, what is the diagnosis the patient has been given to warrant the need for the anticoagulant?  Plavix and ASA    Additional Notes: med hold on plavix and ASA x 5-7 days prior and 3 day post op

## 2022-11-15 NOTE — TELEPHONE ENCOUNTER
Patient has been contacted with results of his biopsy. I have also faxed and contacted patient heart Dr to get clearance to hold his blood thinner.waiting on clearance.

## 2022-11-16 ENCOUNTER — PREP FOR SURGERY (OUTPATIENT)
Dept: OTHER | Facility: HOSPITAL | Age: 82
End: 2022-11-16

## 2022-11-16 DIAGNOSIS — C44.329 SQUAMOUS CELL CANCER OF SKIN OF RIGHT CHEEK: Primary | ICD-10-CM

## 2022-11-16 RX ORDER — BUPIVACAINE HCL/0.9 % NACL/PF 0.1 %
2 PLASTIC BAG, INJECTION (ML) EPIDURAL ONCE
Status: CANCELLED | OUTPATIENT
Start: 2022-11-16 | End: 2022-11-16

## 2022-11-16 NOTE — TELEPHONE ENCOUNTER
Per Dr. Stephani Marie, large risk due to angioplasty and stenting of LAD from [address] February of this year. Can hold if absolutely necessary.

## 2022-11-17 PROBLEM — C44.329 SQUAMOUS CELL CANCER OF SKIN OF RIGHT CHEEK: Status: ACTIVE | Noted: 2022-11-17

## 2022-11-21 ENCOUNTER — TELEPHONE (OUTPATIENT)
Dept: OTOLARYNGOLOGY | Facility: CLINIC | Age: 82
End: 2022-11-21

## 2022-11-21 NOTE — TELEPHONE ENCOUNTER
Patient has been contact with clearance to hold and when to start holding before his surgery and  3 days after .

## 2022-11-23 ENCOUNTER — PRE-ADMISSION TESTING (OUTPATIENT)
Dept: PREADMISSION TESTING | Facility: HOSPITAL | Age: 82
End: 2022-11-23

## 2022-11-23 ENCOUNTER — HOSPITAL ENCOUNTER (OUTPATIENT)
Dept: GENERAL RADIOLOGY | Facility: HOSPITAL | Age: 82
Discharge: HOME | End: 2022-11-23

## 2022-11-23 VITALS
BODY MASS INDEX: 33.92 KG/M2 | HEART RATE: 63 BPM | SYSTOLIC BLOOD PRESSURE: 120 MMHG | OXYGEN SATURATION: 96 % | RESPIRATION RATE: 18 BRPM | WEIGHT: 242.29 LBS | HEIGHT: 71 IN | DIASTOLIC BLOOD PRESSURE: 63 MMHG

## 2022-11-23 DIAGNOSIS — C44.329 SQUAMOUS CELL CANCER OF SKIN OF RIGHT CHEEK: ICD-10-CM

## 2022-11-23 LAB
ANION GAP SERPL CALCULATED.3IONS-SCNC: 9 MMOL/L (ref 5–15)
BUN SERPL-MCNC: 23 MG/DL (ref 8–23)
BUN/CREAT SERPL: 29.1 (ref 7–25)
CALCIUM SPEC-SCNC: 9.4 MG/DL (ref 8.6–10.5)
CHLORIDE SERPL-SCNC: 99 MMOL/L (ref 98–107)
CO2 SERPL-SCNC: 30 MMOL/L (ref 22–29)
CREAT SERPL-MCNC: 0.79 MG/DL (ref 0.76–1.27)
DEPRECATED RDW RBC AUTO: 53.5 FL (ref 37–54)
EGFRCR SERPLBLD CKD-EPI 2021: 88.1 ML/MIN/1.73
ERYTHROCYTE [DISTWIDTH] IN BLOOD BY AUTOMATED COUNT: 15.6 % (ref 12.3–15.4)
GLUCOSE SERPL-MCNC: 128 MG/DL (ref 65–99)
HCT VFR BLD AUTO: 43.8 % (ref 37.5–51)
HGB BLD-MCNC: 13.9 G/DL (ref 13–17.7)
MCH RBC QN AUTO: 29.6 PG (ref 26.6–33)
MCHC RBC AUTO-ENTMCNC: 31.7 G/DL (ref 31.5–35.7)
MCV RBC AUTO: 93.4 FL (ref 79–97)
PLATELET # BLD AUTO: 163 10*3/MM3 (ref 140–450)
PMV BLD AUTO: 11.4 FL (ref 6–12)
POTASSIUM SERPL-SCNC: 3.5 MMOL/L (ref 3.5–5.2)
RBC # BLD AUTO: 4.69 10*6/MM3 (ref 4.14–5.8)
SODIUM SERPL-SCNC: 138 MMOL/L (ref 136–145)
WBC NRBC COR # BLD: 6.83 10*3/MM3 (ref 3.4–10.8)

## 2022-11-23 PROCEDURE — 93005 ELECTROCARDIOGRAM TRACING: CPT

## 2022-11-23 PROCEDURE — 71045 X-RAY EXAM CHEST 1 VIEW: CPT

## 2022-11-23 PROCEDURE — 80048 BASIC METABOLIC PNL TOTAL CA: CPT

## 2022-11-23 PROCEDURE — 93010 ELECTROCARDIOGRAM REPORT: CPT | Performed by: INTERNAL MEDICINE

## 2022-11-23 PROCEDURE — 85027 COMPLETE CBC AUTOMATED: CPT

## 2022-11-23 PROCEDURE — 36415 COLL VENOUS BLD VENIPUNCTURE: CPT

## 2022-11-23 NOTE — DISCHARGE INSTRUCTIONS
Before you come to the hospital        Arrival time: AS DIRECTED BY OFFICE     YOU MAY TAKE THE FOLLOWING MEDICATION(S) THE MORNING OF SURGERY WITH A SIP OF WATER: Propranolol (Inderal)            ALL OTHER HOME MEDICATION CHECK WITH YOUR PHYSICIAN (especially if you are taking diabetes medicines or blood thinners)    Do not take any Erectile Dysfunction medications (EX: CIALIS, VIAGRA) 24 hours prior to surgery.      If you were given and instructed to use a germ- killing soap, use as directed the night before surgery and again the morning of surgery or as directed by your surgeon.    (See attached information for How to Use Chlorhexidine for Bathing if applicable.)            Eating and drinking restrictions prior to scheduled arrival time    2 Hours before arrival time STOP   Drinking Clear liquids (water, apple juice-no pulp)     6 Hours before arrival time STOP   Milk or drinks that contain milk, full liquids    6 Hours before arrival time STOP   Light meals or foods, such as toast or cereal    8 Hours before arrival time STOP   Heavy foods, such as meat, fried foods, or fatty foods    (It is extremely important that you follow these guidelines to prevent delay or cancelation of your procedure)     Clear Liquids  Water and flavored water                                                                      Clear Fruit juices, such as cranberry juice and apple juice.  Black coffee (NO cream of any kind, including powdered).  Plain tea  Clear bouillon or broth.  Flavored gelatin.  Soda.  Gatorade or Powerade.  Full liquid examples  Juices that have pulp.  Frozen ice pops that contain fruit pieces.  Coffee with creamer  Milk.  Yogurt.                MANAGING PAIN AFTER SURGERY    We know you are probably wondering what your pain will be like after surgery.  Following surgery it is unrealistic to expect you will not have pain.   Pain is how our bodies let us know that something is wrong or cautions us to be careful.   That said, our goal is to make your pain tolerable.    Methods we may use to treat your pain include (oral or IV medications, PCAs, epidurals, nerve blocks, etc.)   While some procedures require IV pain medications for a short time after surgery, transitioning to pain medications by mouth allows for better management of pain.   Your nurse will encourage you to take oral pain medications whenever possible.  IV medications work almost immediately, but only last a short while.  Taking medications by mouth allows for a more constant level of medication in your blood stream for a longer period of time.      Once your pain is out of control it is harder to get back under control.  It is important you are aware when your next dose of pain medication is due.  If you are admitted, your nurse may write the time of your next dose on the white board in your room to help you remember.      We are interested in your pain and encourage you to inform us about aggravating factors during your visit.   Many times a simple repositioning every few hours can make a big difference.    If your physician says it is okay, do not let your pain prevent you from getting out of bed. Be sure to call your nurse for assistance prior to getting up so you do not fall.      Before surgery, please decide your tolerable pain goal.  These faces help describe the pain ratings we use on a 0-10 scale.   Be prepared to tell us your goal and whether or not you take pain or anxiety medications at home.          Preparing for Surgery  Preparing for surgery is an important part of your care. It can make things go more smoothly and help you avoid complications. The steps leading up to surgery may vary among hospitals. Follow all instructions given to you by your health care providers. Ask questions if you do not understand something. Talk about any concerns that you have.  Here are some questions to consider asking before your surgery:  If my surgery is not an  emergency (is elective), when would be the best time to have the surgery?  What arrangements do I need to make for work, home, or school?  What will my recovery be like? How long will it be before I can return to normal activities?  Will I need to prepare my home? Will I need to arrange care for me or my children?  Should I expect to have pain after surgery? What are my pain management options? Are there nonmedical options that I can try for pain?  Tell a health care provider about:  Any allergies you have.  All medicines you are taking, including vitamins, herbs, eye drops, creams, and over-the-counter medicines.  Any problems you or family members have had with anesthetic medicines.  Any blood disorders you have.  Any surgeries you have had.  Any medical conditions you have.  Whether you are pregnant or may be pregnant.  What are the risks?  The risks and complications of surgery depend on the specific procedure that you have. Discuss all the risks with your health care providers before your surgery. Ask about common surgical complications, which may include:  Infection.  Bleeding or a need for blood replacement (transfusion).  Allergic reactions to medicines.  Damage to surrounding nerves, tissues, or structures.  A blood clot.  Scarring.  Failure of the surgery to correct the problem.  Follow these instructions before the procedure:  Several days or weeks before your procedure  You may have a physical exam by your primary health care provider to make sure it is safe for you to have surgery.  You may have testing. This may include a chest X-ray, blood and urine tests, electrocardiogram (ECG), or other testing.  Ask your health care provider about:  Changing or stopping your regular medicines. This is especially important if you are taking diabetes medicines or blood thinners.  Taking medicines such as aspirin and ibuprofen. These medicines can thin your blood. Do not take these medicines unless your health care  provider tells you to take them.  Taking over-the-counter medicines, vitamins, herbs, and supplements.  Do not use any products that contain nicotine or tobacco, such as cigarettes and e-cigarettes. If you need help quitting, ask your health care provider.  Avoid alcohol.  Ask your health care provider if there are exercises you can do to prepare for surgery.  Eat a healthy diet.   Plan to have someone take you home from the hospital or clinic.  Plan to have a responsible adult care for you for at least 24 hours after you leave the hospital or clinic. This is important.  The day before your procedure  You may be given antibiotic medicine to take by mouth to help prevent infection. Take it as told by your health care provider.  You may be asked to shower with a germ-killing soap.  Follow instructions from your health care provider about eating and drinking restrictions. This includes gum, mints and hard candy.  Pack comfortable clothes according to your procedure.   The day of your procedure  You may need to take another shower with a germ-killing soap before you leave home in the morning.  With a small sip of water, take only the medicines that you are told to take.  Remove all jewelry including rings.   Leave anything you consider valuable at home except hearing aids if needed.  Do not wear any makeup, nail polish, powder, deodorant, lotion, hair accessories, or anything on your skin or body except your clothes.  If you will be staying in the hospital, bring a case to hold your glasses, contacts, or dentures. You may also want to bring your robe and non-skid footwear.  If you wear oxygen at home, bring it with you the day of surgery.  If instructed by your health care provider, bring your sleep apnea device with you on the day of your surgery (if this applies to you).  You may want to leave your suitcase and sleep apnea device in the car until after surgery.   Arrive at the hospital as scheduled.  Bring a friend or  family member with you who can help to answer questions and be present while you meet with your health care provider.  At the hospital  When you arrive at the hospital:  Go to registration located at the main entrance of the hospital. You will be registered and given a beeper and a sticker sheet. Take the stickers to the Outpatient nurses desk and place in the black tray. This is to notify staff that you have arrived. Then return to the lobby to wait.   When your beeper lights up and vibrates proceed through the double doors, under the stairs, and a member of the Outpatient Surgery staff will escort you to your preoperative room.  You may have to wear compression sleeves. These help to prevent blood clots and reduce swelling in your legs.  An IV may be inserted into one of your veins.              In the operating room, you may be given one or more of the following:        A medicine to help you relax (sedative).        A medicine to numb the area (local anesthetic).        A medicine to make you fall asleep (general anesthetic).        A medicine that is injected into an area of your body to numb everything below the                      injection site (regional anesthetic).  You may be given an antibiotic through your IV to help prevent infection.  Your surgical site will be marked or identified.    Contact a health care provider if you:  Develop a fever of more than 100.4°F (38°C) or other feelings of illness during the 48 hours before your surgery.  Have symptoms that get worse.  Have questions or concerns about your surgery.  Summary  Preparing for surgery can make the procedure go more smoothly and lower your risk of complications.  Before surgery, make a list of questions and concerns to discuss with your surgeon. Ask about the risks and possible complications.  In the days or weeks before your surgery, follow all instructions from your health care provider. You may need to stop smoking, avoid alcohol, follow  eating restrictions, and change or stop your regular medicines.  Contact your surgeon if you develop a fever or other signs of illness during the few days before your surgery.  This information is not intended to replace advice given to you by your health care provider. Make sure you discuss any questions you have with your health care provider.  Document Revised: 12/21/2018 Document Reviewed: 10/23/2018  Elsevier Patient Education © 2021 [employer]

## 2022-11-24 LAB
QT INTERVAL: 440 MS
QTC INTERVAL: 428 MS

## 2022-11-27 ENCOUNTER — TELEPHONE (OUTPATIENT)
Dept: OTOLARYNGOLOGY | Facility: CLINIC | Age: 82
End: 2022-11-27

## 2022-11-28 NOTE — TELEPHONE ENCOUNTER
[facility] received a fax that requires your attention. The document has been indexed to the patient’s chart for your review.      Reason for sending: REPLY TO REQUEST TO HOLD BLOOD THINNERS BEFORE SURGERY    Documents Description: LETTER OF APPROVAL TO TEMPORARILY STOP BLOOD THINNERS PRIOR TO SURGERY ONLY IF ABSOLUTELY NECESSARY    Name of Sender: FLAKITA ALFRED MD    Date Indexed: 11/22/2022    Notes (if needed):

## 2022-12-01 ENCOUNTER — ANESTHESIA EVENT (OUTPATIENT)
Dept: PERIOP | Facility: HOSPITAL | Age: 82
End: 2022-12-01

## 2022-12-01 ENCOUNTER — HOSPITAL ENCOUNTER (OUTPATIENT)
Facility: HOSPITAL | Age: 82
Setting detail: HOSPITAL OUTPATIENT SURGERY
Discharge: HOME | End: 2022-12-01
Attending: OTOLARYNGOLOGY | Admitting: OTOLARYNGOLOGY

## 2022-12-01 ENCOUNTER — ANESTHESIA (OUTPATIENT)
Dept: PERIOP | Facility: HOSPITAL | Age: 82
End: 2022-12-01

## 2022-12-01 VITALS
TEMPERATURE: 98 F | OXYGEN SATURATION: 94 % | SYSTOLIC BLOOD PRESSURE: 124 MMHG | DIASTOLIC BLOOD PRESSURE: 64 MMHG | HEART RATE: 54 BPM | RESPIRATION RATE: 16 BRPM

## 2022-12-01 DIAGNOSIS — C44.329 SQUAMOUS CELL CANCER OF SKIN OF RIGHT CHEEK: ICD-10-CM

## 2022-12-01 PROBLEM — H16.139 ACTINIC KERATITIS: Status: ACTIVE | Noted: 2022-12-01

## 2022-12-01 LAB
GLUCOSE BLDC GLUCOMTR-MCNC: 130 MG/DL (ref 70–130)
GLUCOSE BLDC GLUCOMTR-MCNC: 140 MG/DL (ref 70–130)

## 2022-12-01 PROCEDURE — 14041 TIS TRNFR F/C/C/M/N/A/G/H/F: CPT | Performed by: OTOLARYNGOLOGY

## 2022-12-01 PROCEDURE — 25010000002 CEFAZOLIN PER 500 MG: Performed by: OTOLARYNGOLOGY

## 2022-12-01 PROCEDURE — 13132 CMPLX RPR F/C/C/M/N/AX/G/H/F: CPT | Performed by: OTOLARYNGOLOGY

## 2022-12-01 PROCEDURE — 88305 TISSUE EXAM BY PATHOLOGIST: CPT | Performed by: OTOLARYNGOLOGY

## 2022-12-01 PROCEDURE — 25010000002 DEXAMETHASONE PER 1 MG: Performed by: NURSE ANESTHETIST, CERTIFIED REGISTERED

## 2022-12-01 PROCEDURE — 11642 EXC F/E/E/N/L MAL+MRG 1.1-2: CPT | Performed by: OTOLARYNGOLOGY

## 2022-12-01 PROCEDURE — 25010000002 ONDANSETRON PER 1 MG: Performed by: NURSE ANESTHETIST, CERTIFIED REGISTERED

## 2022-12-01 PROCEDURE — 88331 PATH CONSLTJ SURG 1 BLK 1SPC: CPT | Performed by: OTOLARYNGOLOGY

## 2022-12-01 PROCEDURE — 82962 GLUCOSE BLOOD TEST: CPT

## 2022-12-01 PROCEDURE — 25010000002 PROPOFOL 10 MG/ML EMULSION: Performed by: NURSE ANESTHETIST, CERTIFIED REGISTERED

## 2022-12-01 PROCEDURE — 25010000002 FENTANYL CITRATE (PF) 100 MCG/2ML SOLUTION: Performed by: NURSE ANESTHETIST, CERTIFIED REGISTERED

## 2022-12-01 RX ORDER — ONDANSETRON 2 MG/ML
INJECTION INTRAMUSCULAR; INTRAVENOUS AS NEEDED
Status: DISCONTINUED | OUTPATIENT
Start: 2022-12-01 | End: 2022-12-01 | Stop reason: SURG

## 2022-12-01 RX ORDER — OXYCODONE AND ACETAMINOPHEN 7.5; 325 MG/1; MG/1
2 TABLET ORAL EVERY 4 HOURS PRN
Status: DISCONTINUED | OUTPATIENT
Start: 2022-12-01 | End: 2022-12-01 | Stop reason: HOSPADM

## 2022-12-01 RX ORDER — SODIUM CHLORIDE 0.9 % (FLUSH) 0.9 %
3 SYRINGE (ML) INJECTION EVERY 12 HOURS SCHEDULED
Status: DISCONTINUED | OUTPATIENT
Start: 2022-12-01 | End: 2022-12-01 | Stop reason: HOSPADM

## 2022-12-01 RX ORDER — DEXAMETHASONE SODIUM PHOSPHATE 4 MG/ML
INJECTION, SOLUTION INTRA-ARTICULAR; INTRALESIONAL; INTRAMUSCULAR; INTRAVENOUS; SOFT TISSUE AS NEEDED
Status: DISCONTINUED | OUTPATIENT
Start: 2022-12-01 | End: 2022-12-01 | Stop reason: SURG

## 2022-12-01 RX ORDER — SODIUM CHLORIDE 9 MG/ML
40 INJECTION, SOLUTION INTRAVENOUS AS NEEDED
Status: DISCONTINUED | OUTPATIENT
Start: 2022-12-01 | End: 2022-12-01 | Stop reason: HOSPADM

## 2022-12-01 RX ORDER — HYDROCODONE BITARTRATE AND ACETAMINOPHEN 5; 325 MG/1; MG/1
1 TABLET ORAL ONCE AS NEEDED
Status: DISCONTINUED | OUTPATIENT
Start: 2022-12-01 | End: 2022-12-01 | Stop reason: HOSPADM

## 2022-12-01 RX ORDER — FENTANYL CITRATE 50 UG/ML
INJECTION, SOLUTION INTRAMUSCULAR; INTRAVENOUS AS NEEDED
Status: DISCONTINUED | OUTPATIENT
Start: 2022-12-01 | End: 2022-12-01 | Stop reason: SURG

## 2022-12-01 RX ORDER — LIDOCAINE HYDROCHLORIDE 10 MG/ML
0.5 INJECTION, SOLUTION EPIDURAL; INFILTRATION; INTRACAUDAL; PERINEURAL ONCE AS NEEDED
Status: DISCONTINUED | OUTPATIENT
Start: 2022-12-01 | End: 2022-12-01 | Stop reason: HOSPADM

## 2022-12-01 RX ORDER — SODIUM CHLORIDE, SODIUM LACTATE, POTASSIUM CHLORIDE, CALCIUM CHLORIDE 600; 310; 30; 20 MG/100ML; MG/100ML; MG/100ML; MG/100ML
1000 INJECTION, SOLUTION INTRAVENOUS CONTINUOUS
Status: DISCONTINUED | OUTPATIENT
Start: 2022-12-01 | End: 2022-12-01 | Stop reason: HOSPADM

## 2022-12-01 RX ORDER — IBUPROFEN 600 MG/1
600 TABLET ORAL EVERY 6 HOURS PRN
Status: DISCONTINUED | OUTPATIENT
Start: 2022-12-01 | End: 2022-12-01 | Stop reason: HOSPADM

## 2022-12-01 RX ORDER — ASPIRIN 81 MG/1
81 TABLET, CHEWABLE ORAL ONCE
Status: COMPLETED | OUTPATIENT
Start: 2022-12-01 | End: 2022-12-01

## 2022-12-01 RX ORDER — LABETALOL HYDROCHLORIDE 5 MG/ML
5 INJECTION, SOLUTION INTRAVENOUS
Status: DISCONTINUED | OUTPATIENT
Start: 2022-12-01 | End: 2022-12-01 | Stop reason: HOSPADM

## 2022-12-01 RX ORDER — PROPOFOL 10 MG/ML
VIAL (ML) INTRAVENOUS AS NEEDED
Status: DISCONTINUED | OUTPATIENT
Start: 2022-12-01 | End: 2022-12-01 | Stop reason: SURG

## 2022-12-01 RX ORDER — NALOXONE HCL 0.4 MG/ML
0.4 VIAL (ML) INJECTION AS NEEDED
Status: DISCONTINUED | OUTPATIENT
Start: 2022-12-01 | End: 2022-12-01 | Stop reason: HOSPADM

## 2022-12-01 RX ORDER — DROPERIDOL 2.5 MG/ML
0.62 INJECTION, SOLUTION INTRAMUSCULAR; INTRAVENOUS ONCE AS NEEDED
Status: DISCONTINUED | OUTPATIENT
Start: 2022-12-01 | End: 2022-12-01 | Stop reason: HOSPADM

## 2022-12-01 RX ORDER — ROCURONIUM BROMIDE 10 MG/ML
INJECTION, SOLUTION INTRAVENOUS AS NEEDED
Status: DISCONTINUED | OUTPATIENT
Start: 2022-12-01 | End: 2022-12-01 | Stop reason: SURG

## 2022-12-01 RX ORDER — SODIUM CHLORIDE, SODIUM LACTATE, POTASSIUM CHLORIDE, CALCIUM CHLORIDE 600; 310; 30; 20 MG/100ML; MG/100ML; MG/100ML; MG/100ML
100 INJECTION, SOLUTION INTRAVENOUS CONTINUOUS
Status: DISCONTINUED | OUTPATIENT
Start: 2022-12-01 | End: 2022-12-01 | Stop reason: HOSPADM

## 2022-12-01 RX ORDER — FENTANYL CITRATE 50 UG/ML
25 INJECTION, SOLUTION INTRAMUSCULAR; INTRAVENOUS
Status: DISCONTINUED | OUTPATIENT
Start: 2022-12-01 | End: 2022-12-01 | Stop reason: HOSPADM

## 2022-12-01 RX ORDER — LIDOCAINE HYDROCHLORIDE 20 MG/ML
INJECTION, SOLUTION EPIDURAL; INFILTRATION; INTRACAUDAL; PERINEURAL AS NEEDED
Status: DISCONTINUED | OUTPATIENT
Start: 2022-12-01 | End: 2022-12-01 | Stop reason: SURG

## 2022-12-01 RX ORDER — MAGNESIUM HYDROXIDE 1200 MG/15ML
LIQUID ORAL AS NEEDED
Status: DISCONTINUED | OUTPATIENT
Start: 2022-12-01 | End: 2022-12-01 | Stop reason: HOSPADM

## 2022-12-01 RX ORDER — SUCCINYLCHOLINE/SOD CL,ISO/PF 200MG/10ML
SYRINGE (ML) INTRAVENOUS AS NEEDED
Status: DISCONTINUED | OUTPATIENT
Start: 2022-12-01 | End: 2022-12-01 | Stop reason: SURG

## 2022-12-01 RX ORDER — SODIUM CHLORIDE 0.9 % (FLUSH) 0.9 %
3 SYRINGE (ML) INJECTION AS NEEDED
Status: DISCONTINUED | OUTPATIENT
Start: 2022-12-01 | End: 2022-12-01 | Stop reason: HOSPADM

## 2022-12-01 RX ORDER — BUPIVACAINE HCL/0.9 % NACL/PF 0.1 %
2 PLASTIC BAG, INJECTION (ML) EPIDURAL ONCE
Status: COMPLETED | OUTPATIENT
Start: 2022-12-01 | End: 2022-12-01

## 2022-12-01 RX ORDER — LIDOCAINE HYDROCHLORIDE AND EPINEPHRINE 10; 10 MG/ML; UG/ML
INJECTION, SOLUTION INFILTRATION; PERINEURAL AS NEEDED
Status: DISCONTINUED | OUTPATIENT
Start: 2022-12-01 | End: 2022-12-01 | Stop reason: HOSPADM

## 2022-12-01 RX ORDER — ACETAMINOPHEN 500 MG
1000 TABLET ORAL ONCE
Status: DISCONTINUED | OUTPATIENT
Start: 2022-12-01 | End: 2022-12-01 | Stop reason: HOSPADM

## 2022-12-01 RX ORDER — ONDANSETRON 2 MG/ML
4 INJECTION INTRAMUSCULAR; INTRAVENOUS ONCE AS NEEDED
Status: DISCONTINUED | OUTPATIENT
Start: 2022-12-01 | End: 2022-12-01 | Stop reason: HOSPADM

## 2022-12-01 RX ORDER — OXYCODONE AND ACETAMINOPHEN 10; 325 MG/1; MG/1
1 TABLET ORAL ONCE AS NEEDED
Status: DISCONTINUED | OUTPATIENT
Start: 2022-12-01 | End: 2022-12-01 | Stop reason: HOSPADM

## 2022-12-01 RX ORDER — SODIUM CHLORIDE 0.9 % (FLUSH) 0.9 %
3-10 SYRINGE (ML) INJECTION AS NEEDED
Status: DISCONTINUED | OUTPATIENT
Start: 2022-12-01 | End: 2022-12-01 | Stop reason: HOSPADM

## 2022-12-01 RX ORDER — IBUPROFEN 600 MG/1
600 TABLET ORAL ONCE AS NEEDED
Status: DISCONTINUED | OUTPATIENT
Start: 2022-12-01 | End: 2022-12-01 | Stop reason: HOSPADM

## 2022-12-01 RX ORDER — EPHEDRINE SULFATE 50 MG/ML
INJECTION, SOLUTION INTRAVENOUS AS NEEDED
Status: DISCONTINUED | OUTPATIENT
Start: 2022-12-01 | End: 2022-12-01 | Stop reason: SURG

## 2022-12-01 RX ADMIN — Medication 120 MG: at 07:24

## 2022-12-01 RX ADMIN — EPHEDRINE SULFATE 10 MG: 50 INJECTION INTRAVENOUS at 08:18

## 2022-12-01 RX ADMIN — SODIUM CHLORIDE, POTASSIUM CHLORIDE, SODIUM LACTATE AND CALCIUM CHLORIDE 1000 ML: 600; 310; 30; 20 INJECTION, SOLUTION INTRAVENOUS at 06:04

## 2022-12-01 RX ADMIN — Medication 2 G: at 07:32

## 2022-12-01 RX ADMIN — EPHEDRINE SULFATE 10 MG: 50 INJECTION INTRAVENOUS at 07:33

## 2022-12-01 RX ADMIN — PROPOFOL 200 MG: 10 INJECTION, EMULSION INTRAVENOUS at 07:24

## 2022-12-01 RX ADMIN — ROCURONIUM BROMIDE 10 MG: 10 INJECTION, SOLUTION INTRAVENOUS at 07:24

## 2022-12-01 RX ADMIN — FENTANYL CITRATE 100 MCG: 50 INJECTION INTRAMUSCULAR; INTRAVENOUS at 07:24

## 2022-12-01 RX ADMIN — LIDOCAINE HYDROCHLORIDE 100 MG: 20 INJECTION, SOLUTION EPIDURAL; INFILTRATION; INTRACAUDAL; PERINEURAL at 07:24

## 2022-12-01 RX ADMIN — ASPIRIN 81 MG: 81 TABLET, CHEWABLE ORAL at 06:46

## 2022-12-01 RX ADMIN — ONDANSETRON 4 MG: 2 INJECTION INTRAMUSCULAR; INTRAVENOUS at 09:01

## 2022-12-01 RX ADMIN — DEXAMETHASONE SODIUM PHOSPHATE 4 MG: 4 INJECTION, SOLUTION INTRA-ARTICULAR; INTRALESIONAL; INTRAMUSCULAR; INTRAVENOUS; SOFT TISSUE at 07:30

## 2022-12-01 NOTE — ANESTHESIA PROCEDURE NOTES
Airway  Urgency: elective    Date/Time: 12/1/2022 7:25 AM  Airway not difficult    General Information and Staff    Patient location during procedure: OR  CRNA/CAA: Anival Peacock CRNA    Indications and Patient Condition  Indications for airway management: airway protection    Preoxygenated: yes  Mask difficulty assessment: 1 - vent by mask    Final Airway Details  Final airway type: endotracheal airway      Successful airway: ETT  Cuffed: yes   Successful intubation technique: direct laryngoscopy  Facilitating devices/methods: intubating stylet  Endotracheal tube insertion site: oral  Blade: Hernandez  Blade size: 2  ETT size (mm): 7.0  Cormack-Lehane Classification: grade IIa - partial view of glottis  Placement verified by: capnometry   Cuff volume (mL): 7  Measured from: lips  ETT/EBT  to lips (cm): 22  Number of attempts at approach: 1  Assessment: lips, teeth, and gum same as pre-op and atraumatic intubation

## 2022-12-01 NOTE — ANESTHESIA PREPROCEDURE EVALUATION
Anesthesia Evaluation     Patient summary reviewed   no history of anesthetic complications:  NPO Solid Status: > 6 hours  NPO Liquid Status: > 6 hours           Airway   Mallampati: II  TM distance: >3 FB  Neck ROM: full  no difficulty expected  Dental - normal exam     Pulmonary    (+) COPD, sleep apnea on CPAP,   (-) no home oxygen  Cardiovascular   Exercise tolerance: good (4-7 METS)    ECG reviewed    (+) hypertension well controlled, CAD, hyperlipidemia,   (-) pacemaker, dysrhythmias, cardiac stents, CABG    ROS comment: Stress test:   Summary    Dobutamine stress echocardiogram with electrocardiographic and echocardiographic evidence of myocardial ischemia. 1-2 mm horizontal ST segment depression with dobutamine infusion and an appearance of global hypokinesis concerning for possible global ischemia.      No chest pain with dobutamine infusion.    Frequent atrial and ventricular ectopy with 2 runs of NSVT.    Appropriate hemodynamic response to dobutamine. 1.5 mm ST T wave changes in inferior leads with dobutamine. ECG portion is positive for ischemia by    diagnostic criteria.    The echocardiographic images are suboptimal because of uncertain axis or off axis acquisition but nevertheless wall motion appeared to be  hypercontractile at rest and there was a sense of the development of global hypokinesis with dobutamine infusion, concerning for ischemia, particularly possible global ischemia.       Neuro/Psych  (+) dizziness/light headedness, tremors (essential, followed by neurologist at [place]),    (-) seizures, neuromuscular disease, TIA, CVA  GI/Hepatic/Renal/Endo    (+)   diabetes mellitus type 2,   (-) liver disease, no renal disease    Musculoskeletal     Abdominal    Substance History      OB/GYN          Other   arthritis,    history of cancer                      Anesthesia Plan    ASA 3     general     (Cath since last note from anesthesia---stent placed February. . Note from  "cardiology---\"Advised him to talk to his plastic surgeon regarding the options -either proceed with the surgery while on aspirin and Plavix or postpone for a minimum 6 months. Continue the propranolol 80 mg twice daily along with the aspirin and Plavix. \" Plavix held (we are outside 6 months), but will give preop aspirin     )  intravenous induction     Anesthetic plan, risks, benefits, and alternatives have been provided, discussed and informed consent has been obtained with: patient.      "

## 2022-12-01 NOTE — ANESTHESIA POSTPROCEDURE EVALUATION
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        Post Anesthesia Care and Evaluation    Patient location during evaluation: PACU  Patient participation: complete - patient participated  Level of consciousness: awake and alert  Pain management: adequate    Airway patency: patent  Anesthetic complications: No anesthetic complications    Cardiovascular status: acceptable  Respiratory status: acceptable  Hydration status: acceptable    Comments: Blood pressure 134/66, pulse 62, temperature 98 °F (36.7 °C), temperature source Temporal, resp. rate 16, SpO2 96 %.    Pt discharged from PACU based on bharathi score >8

## 2022-12-01 NOTE — OP NOTE
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   2) actinic keratosis versus squamous cell carcinoma of right mid cheek     PROCEDURE:  1) excision of malignant neoplasm skin right mid cheek, 1.8 cm x 1.8 cm   2) excision of malignant neoplasm (x2) of skin of right lateral cheek, 1.2 cm x 2.2 cm   3) local tissue rearrangement (subcutaneously-pedicled V-Y flap ) skin of right mid cheek, 7.5 cm x 2.0 cm   2) complex closure skin of right lateral cheek, 5.5 cm    SURGEON:  Damien Titus MD, FACS    FACILITY: [facility] Operating Room    ANESTHESIA: General    DICTATED BY:  Damien Titus MD, FACS    IVF: Per anesthesia    EBL: 150 cc    IMPLANTS: None    DRAINS: None    SPECIMENS: 1) squamous cell carcinoma x2, skin of right lateral cheek, stitch at 12:00-frozen   2) actinic keratosis versus squamous cell carcinoma of the right mid cheek, stitch at 12:00-frozen    COMPLICATIONS: None apparent    INDICATIONS FOR SURGERY: Angelic Acevedo presented with 3 lesions of the right cheek.  All 3 had previously been biopsied, the 2 of the lateral cheek were squamous cell carcinoma in situ's.  The lesion of the mid cheek, was actinic keratosis.  Clinically, there is a lesion of the mid cheek.  More sinister than the lateral cheek.  He opted for surgical excision of all 3.    OPERATIVE FINDINGS:   Right mid cheek:  Lesion: 10 mm x 10 mm  Margins: 4 mm  Defect: 1.8 cm x 1.8 cm  Flap and Defect: 7.5 cm x 2.0 cm  Depth: Subcutaneous fat    Right lateral cheek  Lesion: 2 lesions were included, each measuring 6 mm x 6 mm  Margins: 3 mm  Defect: 1.2 cm x 2.2 cm  Closure length: 5.5 cm  Depth: Subcutaneous fat    OPERATIVE DETAILS:       After patient verification consent  material was reviewed, the patient was taken to the operating room and laid supine on the operating table.  After the induction of anesthesia, the skin at each area was cleansed with alcohol, the areas were marked.      The patient was then sterilely prepped and draped.    The 2 lesions in the preauricular skin were close to each other, and were included in the same specimen.  I took 3 mm margins around these, converting this to an oval excision measuring 1.2 cm x 2.2 cm.  The skin was incised utilizing a 15 blade and undermined in the deep subcutaneous fat plane.  A marking stitch was placed at 12:00.    The lesion of the mid cheek was also measured at 10 mm x 10 mm.  4 mm margins were taken around the periphery of this creating a planned circular defect of 1.8 cm x 1.8 cm.  The skin was incised utilizing a fresh 15 blade and undermined in the deep subcutaneous plane.  An orientation stitch was placed at 12:00.    I personally oriented the specimens with the pathologist, as there was some confusion between the nurse and the operating room as to the specimens, and the location.    Frozen section demonstrated clear margins on all.    The right preauricular defect was converted into a fusiform excision, by utilizing a fresh 15 blade to excise the superior and inferior standing status deformities.  I widely undermined for approximately 1.5 cm in each direction and obtain hemostasis with monopolar cautery set at 15.  The skin was advanced and closed lysing buried 4-0 undyed Vicryl suture followed by running, locking 6-0 Prolene.    Utilizing the fresh 15 blade, the flap incisions were then created at the mid cheek.  The flap was then undermined laterally in the immediate subcutaneous plane utilizing retraction with skin hooks and dissection with the curved iris scissors.  This maintained the deep pedicle, from the underlying musculature.  Hemostasis was again obtained utilizing bipolar cautery set at 15.    The flap was  then advanced, and closed utilizing deep 4-0 and 5-0 undyed Vicryl suture.  The skin was further closed utilizing running, locking 6-0 Prolene.      Antibiotic ointment was placed to the incisions and the flaps.      DISPOSITION:  The procedures were completed without complication and tolerated well.  The patient was released in the company of his wife to return home in satisfactory condition.  A follow-up appointment will be scheduled, routine post-op medications prescribed (if required), and post-op instructions were given to the responsible party.           Damien Titus MD, FACS  Board Certified Facial Plastic and Reconstructive Surgery  Board Certified Otolaryngology -- Head and Neck Surgery    Electronically signed by Damien Titus MD, 12/01/22, 9:28 AM CST.

## 2022-12-02 LAB
CYTO UR: NORMAL
LAB AP CASE REPORT: NORMAL
LAB AP CLINICAL INFORMATION: NORMAL
Lab: NORMAL
Lab: NORMAL
PATH REPORT.FINAL DX SPEC: NORMAL
PATH REPORT.GROSS SPEC: NORMAL

## 2022-12-05 ENCOUNTER — TELEPHONE (OUTPATIENT)
Dept: OTOLARYNGOLOGY | Facility: CLINIC | Age: 82
End: 2022-12-05

## 2022-12-05 NOTE — TELEPHONE ENCOUNTER
Caller: CATIA BLANDON    Relationship to patient: WIFE    Best call back number: [phone]    Patient is needing: PT'S WIFE CALLED FOR ONE WEEK FOLLOW UP FOR DR. MORA AND STITCHES REMOVAL. HUB HAD NO OPENINGS AND UNABLE TO WARM TX.    PLEASE CALL AS REQUESTING APPT. 12/08/2022 AS PROCEDURE DONE 12/01/2022

## 2022-12-08 ENCOUNTER — OFFICE VISIT (OUTPATIENT)
Dept: OTOLARYNGOLOGY | Facility: CLINIC | Age: 82
End: 2022-12-08

## 2022-12-08 VITALS — WEIGHT: 242 LBS | TEMPERATURE: 97.2 F | HEIGHT: 71 IN | BODY MASS INDEX: 33.88 KG/M2

## 2022-12-08 DIAGNOSIS — C44.219 BASAL CELL CARCINOMA, EAR, LEFT: ICD-10-CM

## 2022-12-08 DIAGNOSIS — C44.311 BASAL CELL CARCINOMA (BCC) OF DORSUM OF NOSE: ICD-10-CM

## 2022-12-08 DIAGNOSIS — D04.39 SQUAMOUS CELL CARCINOMA IN SITU OF SKIN OF RIGHT CHEEK: Primary | ICD-10-CM

## 2022-12-08 DIAGNOSIS — L57.0 ACTINIC KERATOSIS: ICD-10-CM

## 2022-12-08 PROCEDURE — 99024 POSTOP FOLLOW-UP VISIT: CPT | Performed by: NURSE PRACTITIONER

## 2022-12-31 NOTE — PROGRESS NOTES
URIEL Guthrie     PRIMARY CARE PROVIDER: Irene Griffiths APRN  REFERRING PROVIDER: No ref. provider found    Chief Complaint   Patient presents with   • Wound Check     Wound check to right cheek       Subjective   History of Present Illness:  Chepe Acevedo is a  82 y.o. 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  He has had pain at the surgical site which has improved.  He also reports bleeding postoperatively which has now resolved.  He has also had decreased hearing of the right ear.  This was felt to be due to swelling near the surgical site and right ear canal.  He denies fever, chills, or drainage.    He also underwent excision of a basal cell carcinoma of the left nasal supratip with the east-West flap reconstruction, and excision of basal cell carcinoma of the left auricular helix with full-thickness skin graft reconstruction on September 21, 2022.  Frozen section pathology was demonstrated clear margins, however, permanent section later revealed focal abutment of the 12:00 margin of the nose.  The skin was then utilized as a skin graft, for the left ear.  Part of the skin graft had been trimmed, however.      Review of Systems:  Review of Systems   Constitutional: Negative for chills, fatigue, fever and unexpected weight change.   HENT: Positive for facial swelling and hearing loss.    Respiratory: Negative for cough, chest tightness and shortness of breath.    Cardiovascular: Negative for chest pain.   Musculoskeletal: Negative for neck pain.   Skin: Positive for color change and wound.   Neurological: Negative for facial asymmetry.   Hematological: Negative for adenopathy. Bruises/bleeds easily.       Past History:  Past Medical History:   Diagnosis Date   • Arthritis    • Arthritis    • Asthma    • Basal cell  "carcinoma of skin of face    • BCC (basal cell carcinoma)     nasal dorsum   • Benign hypertrophy of prostate    • Cataract    • COPD (chronic obstructive pulmonary disease) (HCC)    • COPD (chronic obstructive pulmonary disease) (HCC)    • Diabetes (HCC)    • Diabetes mellitus (HCC)     TYPE 2   • Elevated cholesterol    • Hypertension    • Hypertension    • Peripheral neuropathy    • Postural dizziness    • Skin cancer of face    • Sleep apnea in adult    • Tremors of nervous system     \"essential tremors\"     Past Surgical History:   Procedure Laterality Date   • ARM LESION/CYST EXCISION Left 6/28/2021    Procedure: WIDE EXCISION SKIN LESION LEFT SHOULDER;  Surgeon: Marisol Conner MD;  Location:  [facility] OR;  Service: General;  Laterality: Left;   • BASAL CELL CARCINOMA EXCISION  08/03/2014   • CATARACT EXTRACTION W/ INTRAOCULAR LENS  IMPLANT, BILATERAL Bilateral    • CHOLECYSTECTOMY     • EYE SURGERY      cataract   • HEAD/NECK LESION/CYST EXCISION Left 9/26/2016    Procedure: EXCISION LESION left ear and left temple BASAL CELL W/ F/S;  Surgeon: Ashok Harden MD;  Location:  [facility];  Service:    • OTHER SURGICAL HISTORY      Shoulder   • OTHER SURGICAL HISTORY      Thumb Surgery   • SINUS SURGERY     • SKIN LESION EXCISION Right 12/1/2022    Procedure: Excision of 3 Skin Cancers of the Right Cheek with Reconstruction;  Surgeon: Damien Titus MD;  Location:  [facility] OR;  Service: ENT;  Laterality: Right;   • TOTAL SHOULDER ARTHROPLASTY Right    • TRIGGER FINGER RELEASE      LEFT THUMB     Family History   Problem Relation Age of Onset   • Heart disease Father    • Arthritis Mother    • Cancer Sister      Social History     Tobacco Use   • Smoking status: Never   • Smokeless tobacco: Never   Vaping Use   • Vaping Use: Never used   Substance Use Topics   • Alcohol use: Never   • Drug use: No     Allergies:  Chlor-trimeton [chlorpheniramine], Ethylenediamine, Latex, Topiramate, Clortermine, and " Silicone    Current Outpatient Medications:   •  aspirin 81 MG tablet, Take 81 mg by mouth Daily. Delayed Release (DR/EC), Disp: , Rfl:   •  AZELASTINE HCL NA, into the nostril(s) as directed by provider As Needed. Azelastine, Disp: , Rfl:   •  Biotin 5 MG capsule, Take  by mouth Daily., Disp: , Rfl:   •  budesonide (PULMICORT) 0.25 MG/2ML nebulizer solution, Take 0.25 mg by nebulization Daily., Disp: , Rfl:   •  Cholecalciferol (VITAMIN D3 PO), Take 5,000 Int'l Units by mouth daily., Disp: , Rfl:   •  CINNAMON PO, Take 1,000 mg by mouth daily., Disp: , Rfl:   •  citalopram (CeleXA) 10 MG tablet, Take 10 mg by mouth Daily., Disp: , Rfl:   •  clopidogrel (PLAVIX) 75 MG tablet, Take 75 mg by mouth Daily., Disp: , Rfl:   •  Coenzyme Q10 (CoQ-10) 100 MG capsule, Take  by mouth Daily., Disp: , Rfl:   •  COLLAGEN PO, Take 100 mg by mouth Daily., Disp: , Rfl:   •  GuaiFENesin (MUCINEX PO), Take 600 mg by mouth As Needed., Disp: , Rfl:   •  JANUVIA 100 MG tablet, Take 100 mg by mouth daily., Disp: , Rfl:   •  metFORMIN (GLUCOPHAGE) 1000 MG tablet, Take 1,000 mg by mouth 2 (two) times a day with meals., Disp: , Rfl:   •  montelukast (SINGULAIR) 10 MG tablet, Take 10 mg by mouth. Take 1 tablet (10 mg) by oral route once daily in the evening, Disp: , Rfl:   •  Multiple Vitamins-Minerals (OCUVITE ADULT 50+ PO), Take 1 tablet by mouth Daily., Disp: , Rfl:   •  Omega-3 Fatty Acids (OMEGA 3 PO), Take  by mouth Daily. Omega 3 350-400 mg oral capsule, Disp: , Rfl:   •  primidone (MYSOLINE) 50 MG tablet, Take 50 mg by mouth Daily., Disp: , Rfl:   •  propranolol (INDERAL) 80 MG tablet, Take 80 mg by mouth 2 (two) times a day., Disp: , Rfl:   •  RESVERATROL PO, Take 100 mg by mouth daily., Disp: , Rfl:   •  Saw Palmetto, Serenoa repens, (SAW PALMETTO PO), Take 450 mg by mouth Daily., Disp: , Rfl:   •  simvastatin (ZOCOR) 40 MG tablet, Take 40 mg by mouth Every Night., Disp: , Rfl:   •  Turmeric 500 MG capsule, Take 500 mg by mouth  "daily., Disp: , Rfl:   •  zinc gluconate 50 MG tablet, Take  by mouth 2 (Two) Times a Day., Disp: , Rfl:       Objective     Vital Signs:    Temp 97.2 °F (36.2 °C) (Temporal)   Ht 181.1 cm (71.3\")   Wt 110 kg (242 lb)   BMI 33.47 kg/m²     Physical Exam:  Physical Exam  Vitals reviewed.   Constitutional:       General: He is not in acute distress.     Appearance: He is well-developed.   HENT:      Head: Normocephalic and atraumatic.        Right Ear: Tympanic membrane, ear canal and external ear normal.      Left Ear: External ear normal.      Mouth/Throat:      Lips: Pink.   Eyes:      General: Lids are normal.   Pulmonary:      Effort: Pulmonary effort is normal. No respiratory distress.      Breath sounds: No stridor.   Musculoskeletal:      Cervical back: Neck supple.   Neurological:      Mental Status: He is alert and oriented to person, place, and time.   Psychiatric:         Mood and Affect: Mood normal.         Speech: Speech normal.         Behavior: Behavior normal. Behavior is cooperative.       Pathology reviewed:            Assessment   Assessment:  1. 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 Basal cell carcinoma (BCC) of dorsum of nose    3. Basal cell carcinoma, ear, left    4. Actinic keratosis        Plan   Plan:    New Medications Ordered This Visit   Medications   • mupirocin (BACTROBAN) 2 % ointment     Sig: Apply 1 application topically to the appropriate area as directed 3 (Three) Times a Day for 7 days.     Dispense:  22 g     Refill:  0     Continue mupirocin until wound has fully epithelialized.    Protect the incisions from sunlight. Sunlight to the incisions will cause permanent pigmentation to the incision line and make the incision more noticeable. After the incision has reepithelialized (typically 2-3 weeks after the procedure), you may begin to use sunscreen with an SPF of 15 or greater    After the incision has reepithelialized, he may use a OTC silicone-based " wound cream to the incisions to optimize the end result. Apply topically twice daily, or as directed, to help optimize wound healing and decrease redness.    There are no Patient Instructions on file for this visit.  Return in about 6 weeks (around 1/19/2023), or if symptoms worsen or fail to improve, for Recheck.    My findings and recommendations were discussed and questions were answered.     Annie Soliz APRN

## 2023-01-04 RX ORDER — CLOPIDOGREL BISULFATE 75 MG/1
75 TABLET ORAL DAILY
Qty: 30 TABLET | Refills: 5 | Status: SHIPPED | OUTPATIENT
Start: 2023-01-04

## 2023-01-18 ENCOUNTER — OFFICE VISIT (OUTPATIENT)
Dept: OTOLARYNGOLOGY | Facility: CLINIC | Age: 83
End: 2023-01-18
Payer: MEDICARE

## 2023-01-18 VITALS — DIASTOLIC BLOOD PRESSURE: 63 MMHG | HEART RATE: 68 BPM | SYSTOLIC BLOOD PRESSURE: 114 MMHG | TEMPERATURE: 97 F

## 2023-01-18 DIAGNOSIS — C44.219 BASAL CELL CARCINOMA, EAR, LEFT: ICD-10-CM

## 2023-01-18 DIAGNOSIS — D04.39 SQUAMOUS CELL CARCINOMA IN SITU OF SKIN OF RIGHT CHEEK: Primary | ICD-10-CM

## 2023-01-18 DIAGNOSIS — C44.311 BASAL CELL CARCINOMA (BCC) OF DORSUM OF NOSE: ICD-10-CM

## 2023-01-18 PROCEDURE — 99024 POSTOP FOLLOW-UP VISIT: CPT | Performed by: NURSE PRACTITIONER

## 2023-01-18 NOTE — PROGRESS NOTES
URIEL Guthrie     PRIMARY CARE PROVIDER: Irene Griffiths APRN  REFERRING PROVIDER: No ref. provider found    Chief Complaint   Patient presents with   • Squamous Cell Carcinoma     6 week follow up on Scc of right cheek       Subjective   History of Present Illness:  Chepe Acevedo is a  82 y.o. 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  Postoperatively, he had complaints of decreased hearing of the right ear associated with swelling near the surgical site and right ear canal.  He reports his hearing has now returned to his baseline since the swelling has subsided.  He also has some numbness of the right cheek.  He denies pain, fever, chills, bleeding, or drainage.      He also underwent excision of a basal cell carcinoma of the left nasal supratip with the east-West flap reconstruction, and excision of basal cell carcinoma of the left auricular helix with full-thickness skin graft reconstruction on September 21, 2022.  Frozen section pathology was demonstrated clear margins, however, permanent section later revealed focal abutment of the 12:00 margin of the nose.  The skin was then utilized as a skin graft, for the left ear.  Part of the skin graft had been trimmed, however.      Review of Systems:  Review of Systems   Constitutional: Negative for chills, fatigue, fever and unexpected weight change.   HENT: Negative for facial swelling.    Respiratory: Negative for cough, chest tightness and shortness of breath.    Cardiovascular: Negative for chest pain.   Musculoskeletal: Negative for neck pain.   Skin: Negative for color change and wound.   Neurological: Positive for numbness (Right cheek). Negative for facial asymmetry.   Hematological: Negative for adenopathy. Bruises/bleeds easily.       Past History:  Past Medical  "History:   Diagnosis Date   • Arthritis    • Arthritis    • Asthma    • Basal cell carcinoma of skin of face    • BCC (basal cell carcinoma)     nasal dorsum   • Benign hypertrophy of prostate    • Cataract    • COPD (chronic obstructive pulmonary disease) (HCC)    • COPD (chronic obstructive pulmonary disease) (HCC)    • Diabetes (HCC)    • Diabetes mellitus (HCC)     TYPE 2   • Elevated cholesterol    • Hypertension    • Hypertension    • Peripheral neuropathy    • Postural dizziness    • Skin cancer of face    • Sleep apnea in adult    • Tremors of nervous system     \"essential tremors\"     Past Surgical History:   Procedure Laterality Date   • ARM LESION/CYST EXCISION Left 6/28/2021    Procedure: WIDE EXCISION SKIN LESION LEFT SHOULDER;  Surgeon: Marisol Conner MD;  Location: [facility] OR;  Service: General;  Laterality: Left;   • BASAL CELL CARCINOMA EXCISION  08/03/2014   • CATARACT EXTRACTION W/ INTRAOCULAR LENS  IMPLANT, BILATERAL Bilateral    • CHOLECYSTECTOMY     • EYE SURGERY      cataract   • HEAD/NECK LESION/CYST EXCISION Left 9/26/2016    Procedure: EXCISION LESION left ear and left temple BASAL CELL W/ F/S;  Surgeon: Ashok Harden MD;  Location: [facility] OR;  Service:    • OTHER SURGICAL HISTORY      Shoulder   • OTHER SURGICAL HISTORY      Thumb Surgery   • SINUS SURGERY     • SKIN LESION EXCISION Right 12/1/2022    Procedure: Excision of 3 Skin Cancers of the Right Cheek with Reconstruction;  Surgeon: Damien Titus MD;  Location: [facility] OR;  Service: ENT;  Laterality: Right;   • TOTAL SHOULDER ARTHROPLASTY Right    • TRIGGER FINGER RELEASE      LEFT THUMB     Family History   Problem Relation Age of Onset   • Heart disease Father    • Arthritis Mother    • Cancer Sister      Social History     Tobacco Use   • Smoking status: Never   • Smokeless tobacco: Never   Vaping Use   • Vaping Use: Never used   Substance Use Topics   • Alcohol use: Never   • Drug use: No     Allergies:  Chlor-trimeton " [chlorpheniramine], Ethylenediamine, Latex, Topiramate, Clortermine, and Silicone    Current Outpatient Medications:   •  aspirin 81 MG tablet, Take 81 mg by mouth Daily. Delayed Release (DR/EC), Disp: , Rfl:   •  AZELASTINE HCL NA, into the nostril(s) as directed by provider As Needed. Azelastine, Disp: , Rfl:   •  Biotin 5 MG capsule, Take  by mouth Daily., Disp: , Rfl:   •  budesonide (PULMICORT) 0.25 MG/2ML nebulizer solution, Take 0.25 mg by nebulization Daily., Disp: , Rfl:   •  Cholecalciferol (VITAMIN D3 PO), Take 5,000 Int'l Units by mouth daily., Disp: , Rfl:   •  CINNAMON PO, Take 1,000 mg by mouth daily., Disp: , Rfl:   •  citalopram (CeleXA) 10 MG tablet, Take 10 mg by mouth Daily., Disp: , Rfl:   •  clopidogrel (PLAVIX) 75 MG tablet, Take 75 mg by mouth Daily., Disp: , Rfl:   •  Coenzyme Q10 (CoQ-10) 100 MG capsule, Take  by mouth Daily., Disp: , Rfl:   •  COLLAGEN PO, Take 100 mg by mouth Daily., Disp: , Rfl:   •  GuaiFENesin (MUCINEX PO), Take 600 mg by mouth As Needed., Disp: , Rfl:   •  JANUVIA 100 MG tablet, Take 100 mg by mouth daily., Disp: , Rfl:   •  metFORMIN (GLUCOPHAGE) 1000 MG tablet, Take 1,000 mg by mouth 2 (two) times a day with meals., Disp: , Rfl:   •  montelukast (SINGULAIR) 10 MG tablet, Take 10 mg by mouth. Take 1 tablet (10 mg) by oral route once daily in the evening, Disp: , Rfl:   •  Multiple Vitamins-Minerals (OCUVITE ADULT 50+ PO), Take 1 tablet by mouth Daily., Disp: , Rfl:   •  Omega-3 Fatty Acids (OMEGA 3 PO), Take  by mouth Daily. Omega 3 350-400 mg oral capsule, Disp: , Rfl:   •  primidone (MYSOLINE) 50 MG tablet, Take 50 mg by mouth Daily., Disp: , Rfl:   •  propranolol (INDERAL) 80 MG tablet, Take 80 mg by mouth 2 (two) times a day., Disp: , Rfl:   •  RESVERATROL PO, Take 100 mg by mouth daily., Disp: , Rfl:   •  Saw Palmetto, Serenoa repens, (SAW PALMETTO PO), Take 450 mg by mouth Daily., Disp: , Rfl:   •  simvastatin (ZOCOR) 40 MG tablet, Take 40 mg by mouth Every  Night., Disp: , Rfl:   •  Turmeric 500 MG capsule, Take 500 mg by mouth daily., Disp: , Rfl:   •  zinc gluconate 50 MG tablet, Take  by mouth 2 (Two) Times a Day., Disp: , Rfl:       Objective     Vital Signs:  Temp:  [97 °F (36.1 °C)] 97 °F (36.1 °C)  Heart Rate:  [68] 68  BP: (114)/(63) 114/63 /63   Pulse 68   Temp 97 °F (36.1 °C) (Temporal)     Physical Exam:  Physical Exam  Vitals reviewed.   Constitutional:       General: He is not in acute distress.     Appearance: He is well-developed.   HENT:      Head: Normocephalic and atraumatic.        Right Ear: Tympanic membrane, ear canal and external ear normal.      Left Ear: External ear normal.      Mouth/Throat:      Lips: Pink.   Eyes:      General: Lids are normal.   Pulmonary:      Effort: Pulmonary effort is normal. No respiratory distress.      Breath sounds: No stridor.   Musculoskeletal:      Cervical back: Neck supple.   Neurological:      Mental Status: He is alert and oriented to person, place, and time.   Psychiatric:         Mood and Affect: Mood normal.         Speech: Speech normal.         Behavior: Behavior normal. Behavior is cooperative.       Pathology reviewed:            Assessment   Assessment:  1. 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 Basal cell carcinoma (BCC) of dorsum of nose    3. Basal cell carcinoma, ear, left        Plan   Plan:    No orders of the defined types were placed in this encounter.    Protect the incisions from sunlight. Sunlight to the incisions will cause permanent pigmentation to the incision line and make the incision more noticeable. After the incision has reepithelialized (typically 2-3 weeks after the procedure), you may begin to use sunscreen with an SPF of 15 or greater    After the incision has reepithelialized, he may use a OTC silicone-based wound cream to the incisions to optimize the end result. Apply topically twice daily, or as directed, to help optimize wound healing and  decrease redness.    He sees Dr. Shayy Harden every 6 months for routine skin exams and cancer surveillance.    There are no Patient Instructions on file for this visit.  Return in about 6 months (around 7/18/2023), or if symptoms worsen or fail to improve, for Recheck.    My findings and recommendations were discussed and questions were answered.     Annie Soliz, APRN

## 2023-02-15 ENCOUNTER — TELEPHONE (OUTPATIENT)
Dept: CARDIOLOGY CLINIC | Age: 83
End: 2023-02-15

## 2023-02-15 NOTE — TELEPHONE ENCOUNTER
Date: 3/7/23, 3/21/23,4/4/23     Cardiologist: Dr. Javad Mead     Procedure: deep brain stimulator implant for Parkinsons     Surgeon: Jose Hart Office Visit: 10-19-22     Reason for office visit and medical concerns addressed at this office visit: CAD, HTN, hyperlipidemia, DM     Testing Performed and Date of Service:  8/18/21 dede  Dobutamine stress echocardiogram with electrocardiographic and   echocardiographic evidence of myocardial ischemia. 1-2 mm horizontal ST   segment depression with dobutamine infusion and an appearance of global   hypokinesis concerning for possible global ischemia. Does the patient have a stent? If so, what type? Not in last year      Current Medications: lozol, probiotic, vit C, calcitrate, cinnamon, coconut oil, mg, plavix, ASA, celexa, pulmicort, mysoline, metformin, zocor, inderal, singulair, januvia      Is the patient currently taking an anticoagulant? If so, what is the diagnosis the patient has been given to warrant the need for the anticoagulant?  Plavix and ASA     Additional Notes: requesting to hold plavix and aspirin from 2/28/23-4/7/23

## 2023-02-15 NOTE — TELEPHONE ENCOUNTER
Per Dr. Junior Rodriguez, patient did have stent to LAD from Dr. Hamlin Cone 2/25/22. Cleared to start holding plavix and asa on 2/28.

## 2023-05-01 ENCOUNTER — OFFICE VISIT (OUTPATIENT)
Dept: CARDIOLOGY CLINIC | Age: 83
End: 2023-05-01
Payer: MEDICARE

## 2023-05-01 VITALS
SYSTOLIC BLOOD PRESSURE: 104 MMHG | OXYGEN SATURATION: 95 % | BODY MASS INDEX: 31.04 KG/M2 | HEART RATE: 54 BPM | WEIGHT: 229.2 LBS | HEIGHT: 72 IN | DIASTOLIC BLOOD PRESSURE: 64 MMHG

## 2023-05-01 DIAGNOSIS — I10 ESSENTIAL HYPERTENSION: ICD-10-CM

## 2023-05-01 DIAGNOSIS — G20 PARKINSON'S DISEASE (HCC): ICD-10-CM

## 2023-05-01 DIAGNOSIS — I25.10 CORONARY ARTERY DISEASE INVOLVING NATIVE CORONARY ARTERY OF NATIVE HEART WITHOUT ANGINA PECTORIS: Primary | ICD-10-CM

## 2023-05-01 DIAGNOSIS — E78.5 DYSLIPIDEMIA: ICD-10-CM

## 2023-05-01 PROCEDURE — 3078F DIAST BP <80 MM HG: CPT | Performed by: CLINICAL NURSE SPECIALIST

## 2023-05-01 PROCEDURE — G8427 DOCREV CUR MEDS BY ELIG CLIN: HCPCS | Performed by: CLINICAL NURSE SPECIALIST

## 2023-05-01 PROCEDURE — 99214 OFFICE O/P EST MOD 30 MIN: CPT | Performed by: CLINICAL NURSE SPECIALIST

## 2023-05-01 PROCEDURE — 1036F TOBACCO NON-USER: CPT | Performed by: CLINICAL NURSE SPECIALIST

## 2023-05-01 PROCEDURE — G8417 CALC BMI ABV UP PARAM F/U: HCPCS | Performed by: CLINICAL NURSE SPECIALIST

## 2023-05-01 PROCEDURE — 1123F ACP DISCUSS/DSCN MKR DOCD: CPT | Performed by: CLINICAL NURSE SPECIALIST

## 2023-05-01 PROCEDURE — 3074F SYST BP LT 130 MM HG: CPT | Performed by: CLINICAL NURSE SPECIALIST

## 2023-05-01 RX ORDER — B-COMPLEX WITH VITAMIN C
1 TABLET ORAL DAILY
COMMUNITY

## 2023-05-01 ASSESSMENT — ENCOUNTER SYMPTOMS
NAUSEA: 0
SHORTNESS OF BREATH: 0
CHEST TIGHTNESS: 0
VOMITING: 0
ABDOMINAL PAIN: 0
EYE REDNESS: 0
FACIAL SWELLING: 0
COUGH: 0
WHEEZING: 0

## 2023-05-01 NOTE — PROGRESS NOTES
propranolol (INDERAL) 80 MG tablet Take 1 tablet by mouth 2 times daily      montelukast (SINGULAIR) 10 MG tablet Take 1 tablet by mouth nightly      SITagliptin (JANUVIA) 100 MG tablet Take 1 tablet by mouth daily       No current facility-administered medications for this visit. Allergies: Chlorethamine [ethylenediamine] and Silicone    Review of Systems  Review of Systems   Constitutional:  Positive for fatigue. Negative for activity change, diaphoresis, fever and unexpected weight change. HENT:  Negative for facial swelling and nosebleeds. Eyes:  Negative for redness and visual disturbance. Respiratory:  Negative for cough, chest tightness, shortness of breath and wheezing. Cardiovascular:  Negative for chest pain, palpitations and leg swelling. Gastrointestinal:  Negative for abdominal pain, nausea and vomiting. Endocrine: Negative for cold intolerance and heat intolerance. Genitourinary:  Negative for dysuria and hematuria. Musculoskeletal:  Negative for arthralgias and myalgias. Complains of mobility issues   Skin:  Negative for pallor and rash. Neurological:  Positive for tremors. Negative for dizziness, seizures, syncope, weakness and light-headedness. Hematological:  Does not bruise/bleed easily. Psychiatric/Behavioral:  Negative for agitation. The patient is not nervous/anxious. Objective  Vital Signs - /64   Pulse 54   Ht 6' (1.829 m)   Wt 229 lb 3.2 oz (104 kg)   SpO2 95%   BMI 31.09 kg/m²   Physical Exam  Vitals and nursing note reviewed. Constitutional:       General: He is not in acute distress. Appearance: He is well-developed. He is not diaphoretic. Comments: Elderly, deconditioned   HENT:      Head: Normocephalic and atraumatic. Right Ear: Hearing and external ear normal.      Left Ear: Hearing and external ear normal.      Nose: Nose normal.   Eyes:      General:         Right eye: No discharge. Left eye: No discharge.

## 2023-06-06 ENCOUNTER — TRANSCRIBE ORDERS (OUTPATIENT)
Dept: PHYSICAL THERAPY | Facility: HOSPITAL | Age: 83
End: 2023-06-06
Payer: MEDICARE

## 2023-06-06 DIAGNOSIS — G25.0 ESSENTIAL TREMOR: Primary | ICD-10-CM

## 2023-09-11 ENCOUNTER — OFFICE VISIT (OUTPATIENT)
Dept: OTOLARYNGOLOGY | Facility: CLINIC | Age: 83
End: 2023-09-11
Payer: MEDICARE

## 2023-09-11 VITALS — DIASTOLIC BLOOD PRESSURE: 77 MMHG | SYSTOLIC BLOOD PRESSURE: 133 MMHG | TEMPERATURE: 97.4 F | HEART RATE: 61 BPM

## 2023-09-11 DIAGNOSIS — C44.311 BASAL CELL CARCINOMA (BCC) OF DORSUM OF NOSE: ICD-10-CM

## 2023-09-11 DIAGNOSIS — Z85.828 ENCOUNTER FOR FOLLOW-UP SURVEILLANCE OF SKIN CANCER: ICD-10-CM

## 2023-09-11 DIAGNOSIS — Z08 ENCOUNTER FOR FOLLOW-UP SURVEILLANCE OF SKIN CANCER: ICD-10-CM

## 2023-09-11 DIAGNOSIS — C44.219 BASAL CELL CARCINOMA, EAR, LEFT: ICD-10-CM

## 2023-09-11 DIAGNOSIS — L82.0 INFLAMED SEBORRHEIC KERATOSIS: ICD-10-CM

## 2023-09-11 DIAGNOSIS — D04.39 SQUAMOUS CELL CARCINOMA IN SITU OF SKIN OF RIGHT CHEEK: Primary | ICD-10-CM

## 2023-09-11 PROCEDURE — 1159F MED LIST DOCD IN RCRD: CPT | Performed by: NURSE PRACTITIONER

## 2023-09-11 PROCEDURE — 3078F DIAST BP <80 MM HG: CPT | Performed by: NURSE PRACTITIONER

## 2023-09-11 PROCEDURE — 17110 DESTRUCTION B9 LES UP TO 14: CPT | Performed by: NURSE PRACTITIONER

## 2023-09-11 PROCEDURE — 1160F RVW MEDS BY RX/DR IN RCRD: CPT | Performed by: NURSE PRACTITIONER

## 2023-09-11 PROCEDURE — 3075F SYST BP GE 130 - 139MM HG: CPT | Performed by: NURSE PRACTITIONER

## 2023-09-11 PROCEDURE — 99212 OFFICE O/P EST SF 10 MIN: CPT | Performed by: NURSE PRACTITIONER

## 2023-09-11 NOTE — PROGRESS NOTES
URIEL Guthrie     PRIMARY CARE PROVIDER: Irene Griffiths APRN  REFERRING PROVIDER: No ref. provider found    Chief Complaint   Patient presents with    Squamous Cell Carcinoma     1 year follow up on Exc of Scc of right cheek    Skin Lesion     Skin lesion to left cheek area       Subjective   History of Present Illness:  Chepe Acevedo is a  83 y.o. 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  Postoperatively, he has been doing relatively well. He denies pain, fever, chills, bleeding, or drainage.      He also underwent excision of a basal cell carcinoma of the left nasal supratip with the east-West flap reconstruction, and excision of basal cell carcinoma of the left auricular helix with full-thickness skin graft reconstruction on September 21, 2022.  Frozen section pathology was demonstrated clear margins, however, permanent section later revealed focal abutment of the 12:00 margin of the nose.  The skin was then utilized as a skin graft, for the left ear.  Part of the skin graft had been trimmed, however.    Today, he complains of a new skin lesion. The lesion has the following characteristics  Location: Left inferior preauricular cheek  Quality: Raised  Severity: Mild  Duration: A few months  Timing: Constant  Modifying Factors: None  Associated Signs & Symptoms: No itching, burning, bleeding, or change in size.    He has had recent deep brain stimulation.    Review of Systems:  Review of Systems   Constitutional:  Negative for chills, fatigue, fever and unexpected weight change.   HENT:  Negative for facial swelling.    Respiratory:  Negative for cough, chest tightness and shortness of breath.    Cardiovascular:  Negative for chest pain.   Musculoskeletal:  Negative for neck pain.   Skin:  Negative for color change and  "wound.   Neurological:  Positive for speech difficulty and weakness. Negative for facial asymmetry.   Hematological:  Negative for adenopathy. Bruises/bleeds easily.     Past History:  Past Medical History:   Diagnosis Date    Arthritis     Arthritis     Asthma     Basal cell carcinoma of skin of face     BCC (basal cell carcinoma)     nasal dorsum    Benign hypertrophy of prostate     Cataract     COPD (chronic obstructive pulmonary disease)     COPD (chronic obstructive pulmonary disease)     Diabetes     Diabetes mellitus     TYPE 2    Elevated cholesterol     Hypertension     Hypertension     Peripheral neuropathy     Postural dizziness     Skin cancer of face     Sleep apnea in adult     Tremors of nervous system     \"essential tremors\"     Past Surgical History:   Procedure Laterality Date    ARM LESION/CYST EXCISION Left 06/28/2021    Procedure: WIDE EXCISION SKIN LESION LEFT SHOULDER;  Surgeon: Marisol Conner MD;  Location: [facility] OR;  Service: General;  Laterality: Left;    BASAL CELL CARCINOMA EXCISION  08/03/2014    BRAIN SURGERY      CATARACT EXTRACTION W/ INTRAOCULAR LENS  IMPLANT, BILATERAL Bilateral     CHOLECYSTECTOMY      EYE SURGERY      cataract    HEAD/NECK LESION/CYST EXCISION Left 09/26/2016    Procedure: EXCISION LESION left ear and left temple BASAL CELL W/ F/S;  Surgeon: Ashok Harden MD;  Location: [facility] OR;  Service:     OTHER SURGICAL HISTORY      Shoulder    OTHER SURGICAL HISTORY      Thumb Surgery    SINUS SURGERY      SKIN LESION EXCISION Right 12/01/2022    Procedure: Excision of 3 Skin Cancers of the Right Cheek with Reconstruction;  Surgeon: Damien Titus MD;  Location: [facility] OR;  Service: ENT;  Laterality: Right;    TOTAL SHOULDER ARTHROPLASTY Right     TRIGGER FINGER RELEASE      LEFT THUMB     Family History   Problem Relation Age of Onset    Heart disease Father     Arthritis Mother     Cancer Sister      Social History     Tobacco Use    Smoking status: Never    " Smokeless tobacco: Never   Vaping Use    Vaping Use: Never used   Substance Use Topics    Alcohol use: Never    Drug use: No     Allergies:  Chlor-trimeton [chlorpheniramine], Ethylenediamine, Latex, Topiramate, Clortermine, and Silicone    Current Outpatient Medications:     aspirin 81 MG tablet, Take 1 tablet by mouth Daily. Delayed Release (DR/EC), Disp: , Rfl:     AZELASTINE HCL NA, into the nostril(s) as directed by provider As Needed. Azelastine, Disp: , Rfl:     Biotin 5 MG capsule, Take  by mouth Daily., Disp: , Rfl:     budesonide (PULMICORT) 0.25 MG/2ML nebulizer solution, Take 2 mL by nebulization Daily., Disp: , Rfl:     Cholecalciferol (VITAMIN D3 PO), Take 5,000 Int'l Units by mouth daily., Disp: , Rfl:     CINNAMON PO, Take 1,000 mg by mouth daily., Disp: , Rfl:     citalopram (CeleXA) 10 MG tablet, Take 1 tablet by mouth Daily., Disp: , Rfl:     clopidogrel (PLAVIX) 75 MG tablet, Take 1 tablet by mouth Daily., Disp: , Rfl:     Coenzyme Q10 (CoQ-10) 100 MG capsule, Take  by mouth Daily., Disp: , Rfl:     COLLAGEN PO, Take 100 mg by mouth Daily., Disp: , Rfl:     GuaiFENesin (MUCINEX PO), Take 600 mg by mouth As Needed., Disp: , Rfl:     JANUVIA 100 MG tablet, Take 1 tablet by mouth Daily., Disp: , Rfl:     metFORMIN (GLUCOPHAGE) 1000 MG tablet, Take 1 tablet by mouth 2 (Two) Times a Day With Meals., Disp: , Rfl:     montelukast (SINGULAIR) 10 MG tablet, Take 1 tablet by mouth. Take 1 tablet (10 mg) by oral route once daily in the evening, Disp: , Rfl:     Multiple Vitamins-Minerals (OCUVITE ADULT 50+ PO), Take 1 tablet by mouth Daily., Disp: , Rfl:     Omega-3 Fatty Acids (OMEGA 3 PO), Take  by mouth Daily. Omega 3 350-400 mg oral capsule, Disp: , Rfl:     propranolol (INDERAL) 80 MG tablet, Take 1 tablet by mouth 2 (Two) Times a Day., Disp: , Rfl:     RESVERATROL PO, Take 100 mg by mouth daily., Disp: , Rfl:     Saw Palmetto, Serenoa repens, (SAW PALMETTO PO), Take 450 mg by mouth Daily., Disp: ,  Rfl:     simvastatin (ZOCOR) 40 MG tablet, Take 1 tablet by mouth Every Night., Disp: , Rfl:     Turmeric 500 MG capsule, Take 1 capsule by mouth Daily., Disp: , Rfl:     zinc gluconate 50 MG tablet, Take  by mouth 2 (Two) Times a Day., Disp: , Rfl:       Objective     Vital Signs:  Temp:  [97.4 °F (36.3 °C)] 97.4 °F (36.3 °C)  Heart Rate:  [61] 61  BP: (133)/(77) 133/77 /77   Pulse 61   Temp 97.4 °F (36.3 °C) (Temporal)     Physical Exam:  Physical Exam  Vitals reviewed.   Constitutional:       General: He is not in acute distress.     Appearance: He is well-developed.   HENT:      Head: Normocephalic and atraumatic.        Right Ear: Tympanic membrane, ear canal and external ear normal.      Left Ear: External ear normal.      Mouth/Throat:      Lips: Pink.   Eyes:      General: Lids are normal.   Pulmonary:      Effort: Pulmonary effort is normal. No respiratory distress.      Breath sounds: No stridor.   Musculoskeletal:      Cervical back: Neck supple.   Neurological:      Mental Status: He is alert and oriented to person, place, and time.   Psychiatric:         Mood and Affect: Mood normal.         Speech: Speech normal.         Behavior: Behavior normal. Behavior is cooperative.     Pathology reviewed:            Assessment   Assessment:  1. 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 Basal cell carcinoma (BCC) of dorsum of nose    3. Basal cell carcinoma, ear, left    4. Inflamed seborrheic keratosis    5. Encounter for follow-up surveillance of skin cancer          Plan   Plan:    I have offered treatment of the inflamed seborrheic keratosis of the left inferior preauricular cheek with cryotherapy.  The patient is in agreement with this plan.  Prior to the procedure, we discussed the risks, benefits, alternatives, and potential complications of cryotherapy which include, but are not limited to: scarring, discoloration, recurrence, pain, need for additional procedures.  Alternatives  include doing nothing.  Patient tolerated procedure well.  See procedure note.    Protect the incisions from sunlight. Sunlight to the incisions will cause permanent pigmentation to the incision line and make the incision more noticeable. After the incision has reepithelialized (typically 2-3 weeks after the procedure), you may begin to use sunscreen with an SPF of 15 or greater    After the incision has reepithelialized, he may use a OTC silicone-based wound cream to the incisions to optimize the end result. Apply topically twice daily, or as directed, to help optimize wound healing and decrease redness.    He sees Dr. Shayy Harden every 6 months for routine skin exams and cancer surveillance.    There are no Patient Instructions on file for this visit.  Return in about 3 months (around 12/11/2023), or if symptoms worsen or fail to improve, for Recheck.    My findings and recommendations were discussed and questions were answered.     Annie Soliz, APRN

## 2023-09-11 NOTE — PROGRESS NOTES
Preoperative Diagnosis: Seborrheic keratosis of  left inferior preauricular cheek    Postoperative Diagnosis: Same    Procedure: Destruction with Cryotherapy     Provider: URIEL Ventura    Anesthesia: None    Location: [facility] office    Complications: None    Details of Procedure:     Patient identity was verified and consent was signed. The lesion was treated with 2 pulses of 6 second duration each; allowing the skin to completely thaw between pulses. The patient tolerated the procedure without complication.    URIEL Guthrie  09/11/23  11:44 CDT

## 2023-11-06 ENCOUNTER — OFFICE VISIT (OUTPATIENT)
Dept: CARDIOLOGY CLINIC | Age: 83
End: 2023-11-06
Payer: MEDICARE

## 2023-11-06 VITALS
HEART RATE: 62 BPM | WEIGHT: 234 LBS | DIASTOLIC BLOOD PRESSURE: 84 MMHG | BODY MASS INDEX: 31.69 KG/M2 | HEIGHT: 72 IN | SYSTOLIC BLOOD PRESSURE: 134 MMHG

## 2023-11-06 DIAGNOSIS — G47.33 OBSTRUCTIVE SLEEP APNEA: ICD-10-CM

## 2023-11-06 DIAGNOSIS — I10 ESSENTIAL HYPERTENSION: ICD-10-CM

## 2023-11-06 DIAGNOSIS — E78.5 DYSLIPIDEMIA: ICD-10-CM

## 2023-11-06 DIAGNOSIS — E11.69 TYPE 2 DIABETES MELLITUS WITH OTHER SPECIFIED COMPLICATION, WITHOUT LONG-TERM CURRENT USE OF INSULIN (HCC): ICD-10-CM

## 2023-11-06 DIAGNOSIS — G25.0 ESSENTIAL TREMOR: ICD-10-CM

## 2023-11-06 DIAGNOSIS — I25.10 CORONARY ARTERY DISEASE INVOLVING NATIVE CORONARY ARTERY OF NATIVE HEART WITHOUT ANGINA PECTORIS: Primary | ICD-10-CM

## 2023-11-06 PROBLEM — G20.A1 PARKINSON'S DISEASE: Status: RESOLVED | Noted: 2019-07-03 | Resolved: 2023-11-06

## 2023-11-06 PROCEDURE — 1123F ACP DISCUSS/DSCN MKR DOCD: CPT | Performed by: CLINICAL NURSE SPECIALIST

## 2023-11-06 PROCEDURE — G8417 CALC BMI ABV UP PARAM F/U: HCPCS | Performed by: CLINICAL NURSE SPECIALIST

## 2023-11-06 PROCEDURE — 99214 OFFICE O/P EST MOD 30 MIN: CPT | Performed by: CLINICAL NURSE SPECIALIST

## 2023-11-06 PROCEDURE — 1036F TOBACCO NON-USER: CPT | Performed by: CLINICAL NURSE SPECIALIST

## 2023-11-06 PROCEDURE — 3079F DIAST BP 80-89 MM HG: CPT | Performed by: CLINICAL NURSE SPECIALIST

## 2023-11-06 PROCEDURE — 93000 ELECTROCARDIOGRAM COMPLETE: CPT | Performed by: CLINICAL NURSE SPECIALIST

## 2023-11-06 PROCEDURE — 3075F SYST BP GE 130 - 139MM HG: CPT | Performed by: CLINICAL NURSE SPECIALIST

## 2023-11-06 PROCEDURE — G8427 DOCREV CUR MEDS BY ELIG CLIN: HCPCS | Performed by: CLINICAL NURSE SPECIALIST

## 2023-11-06 PROCEDURE — G8484 FLU IMMUNIZE NO ADMIN: HCPCS | Performed by: CLINICAL NURSE SPECIALIST

## 2023-11-06 ASSESSMENT — ENCOUNTER SYMPTOMS
EYE REDNESS: 0
FACIAL SWELLING: 0
ABDOMINAL PAIN: 0
WHEEZING: 0
VOMITING: 0
NAUSEA: 0
CHEST TIGHTNESS: 0
SHORTNESS OF BREATH: 0
COUGH: 0

## 2023-11-06 NOTE — PROGRESS NOTES
[facility], [address] [address]  Phone: [phone]  Fax: [phone]    OFFICE VISIT:  2023    [facility] - : 1940    Reason For Visit:  Jake Conteh is a 79 y.o. male who is here for 6 Month Follow-Up and Coronary Artery Disease       Diagnosis Orders   1. Coronary artery disease involving native coronary artery of native heart without angina pectoris        2. Essential hypertension        3. Dyslipidemia        4. Essential tremor        5. Obstructive sleep apnea        6. Type 2 diabetes mellitus with other specified complication, without long-term current use of insulin (HCC)              HPI  Pt with a history of dyslipidemia, diabetes, hypertension, and coronary disease returns for a follow-up visit. He underwent first angiographic assessment  with a 20% proximal LAD stenosis. An abnormal dobutamine stress echo was obtained in 2021 with delayed angiographic assessment in February of this year at which time he was shown to have a 90% proximal to mid LAD stenosis, and a 30 to 40% lesion in his mid/distal right coronary. The LAD stenosis was stented with a 2.5 x 15 mm drug-eluting stent at [facility], Dr Bonita David. Since his last visit here, patient had a nerve stimulator placed for essential tremors at [facility]. He has had some improvement in the ability to do his ADLs, feed himself, dress himself etc.  But the device has also caused some issues with speech, hiccups, and he seems to fall more often. Has bruised his left forearm after a fall 1 week ago    Pt denies Chest pain, unusual dyspnea, orthopnea, PND, edema, palpitations. JOVANNY Serrato CNP is PCP.   Tessa Odonnell has the following history as recorded in [facility]:    Patient Active Problem List    Diagnosis Date Noted    Obstructive sleep apnea 2023    Essential hypertension 2019    Diabetes mellitus ([facility]) 2019    Coronary

## 2023-11-06 NOTE — PATIENT INSTRUCTIONS
Return in about 9 months (around 8/6/2024) for APRN. Keep diabetes under control to help prevent further heart disease. Keep Hemoglobin A1C less than 7%.    Fall precuations- use walker at all times to prevent falls

## 2024-01-10 ENCOUNTER — OFFICE VISIT (OUTPATIENT)
Age: 84
End: 2024-01-10
Payer: MEDICARE

## 2024-01-10 VITALS — BODY MASS INDEX: 33.6 KG/M2 | TEMPERATURE: 97 F | HEIGHT: 71 IN | WEIGHT: 240 LBS

## 2024-01-10 DIAGNOSIS — C44.311 BASAL CELL CARCINOMA (BCC) OF NASOLABIAL GROOVE: Primary | ICD-10-CM

## 2024-01-10 PROCEDURE — 1159F MED LIST DOCD IN RCRD: CPT | Performed by: OTOLARYNGOLOGY

## 2024-01-10 PROCEDURE — 1160F RVW MEDS BY RX/DR IN RCRD: CPT | Performed by: OTOLARYNGOLOGY

## 2024-01-10 PROCEDURE — 99213 OFFICE O/P EST LOW 20 MIN: CPT | Performed by: OTOLARYNGOLOGY

## 2024-01-10 NOTE — PROGRESS NOTES
"PRIMARY CARE PROVIDER: Irene Griffiths APRN  REFERRING PROVIDER: No ref. provider found    Chief Complaint   Patient presents with    Suspicious Skin Lesion     Skin lesion to left nasal ala       Subjective   History of Present Illness:  Chepe Acevedo is a  83 y.o. 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  Prior to the biopsy, the lesion had the following characteristics:    Quality: raised, bleeding  Severity: mild  Duration: 6-8 months  Timing: constant  Modifying Factors: None  Associated Signs & Symptoms: No itching, burning, or change in size.        He is currently taking 81mg ASA.       Review of Systems:  Review of Systems   Constitutional:  Negative for chills, fatigue, fever and unexpected weight change.   HENT:  Negative for facial swelling.    Respiratory:  Negative for cough, chest tightness and shortness of breath.    Cardiovascular:  Negative for chest pain.   Musculoskeletal:  Negative for neck pain.   Skin:  Negative for color change and wound.   Neurological:  Positive for speech difficulty and weakness. Negative for facial asymmetry.   Hematological:  Negative for adenopathy. Bruises/bleeds easily.       Past History:  Past Medical History:   Diagnosis Date    Arthritis     Arthritis     Asthma     Basal cell carcinoma of skin of face     BCC (basal cell carcinoma)     nasal dorsum    Benign hypertrophy of prostate     Cataract     COPD (chronic obstructive pulmonary disease)     COPD (chronic obstructive pulmonary disease)     Diabetes     Diabetes mellitus     TYPE 2    Elevated cholesterol     Hypertension     Hypertension     Peripheral neuropathy     Postural dizziness     Skin cancer of face     Sleep apnea in adult     Tremors of nervous system     \"essential tremors\"     Past Surgical History:   Procedure Laterality Date    ARM LESION/CYST EXCISION Left 06/28/2021    Procedure: WIDE EXCISION SKIN LESION " LEFT SHOULDER;  Surgeon: Marisol Conner MD;  Location:  [facility] OR;  Service: General;  Laterality: Left;    BASAL CELL CARCINOMA EXCISION  08/03/2014    BRAIN SURGERY      CATARACT EXTRACTION W/ INTRAOCULAR LENS  IMPLANT, BILATERAL Bilateral     CHOLECYSTECTOMY      EYE SURGERY      cataract    HEAD/NECK LESION/CYST EXCISION Left 09/26/2016    Procedure: EXCISION LESION left ear and left temple BASAL CELL W/ F/S;  Surgeon: Ashok Harden MD;  Location:  [facility] OR;  Service:     OTHER SURGICAL HISTORY      Shoulder    OTHER SURGICAL HISTORY      Thumb Surgery    SINUS SURGERY      SKIN LESION EXCISION Right 12/01/2022    Procedure: Excision of 3 Skin Cancers of the Right Cheek with Reconstruction;  Surgeon: Damien Titus MD;  Location:  [facility];  Service: ENT;  Laterality: Right;    TOTAL SHOULDER ARTHROPLASTY Right     TRIGGER FINGER RELEASE      LEFT THUMB     Family History   Problem Relation Age of Onset    Heart disease Father     Arthritis Mother     Cancer Sister      Social History     Tobacco Use    Smoking status: Never    Smokeless tobacco: Never   Vaping Use    Vaping Use: Never used   Substance Use Topics    Alcohol use: Never    Drug use: No     Allergies:  Chlor-trimeton [chlorpheniramine], Ethylenediamine, Latex, Topiramate, Clortermine, and Silicone    Current Outpatient Medications:     aspirin 81 MG tablet, Take 1 tablet by mouth Daily. Delayed Release (DR/EC), Disp: , Rfl:     AZELASTINE HCL NA, into the nostril(s) as directed by provider As Needed. Azelastine, Disp: , Rfl:     Biotin 5 MG capsule, Take  by mouth Daily., Disp: , Rfl:     budesonide (PULMICORT) 0.25 MG/2ML nebulizer solution, Take 2 mL by nebulization Daily., Disp: , Rfl:     Cholecalciferol (VITAMIN D3 PO), Take 5,000 Int'l Units by mouth daily., Disp: , Rfl:     CINNAMON PO, Take 1,000 mg by mouth daily., Disp: , Rfl:     citalopram (CeleXA) 10 MG tablet, Take 1 tablet by mouth Daily., Disp: , Rfl:     clopidogrel  (PLAVIX) 75 MG tablet, Take 1 tablet by mouth Daily., Disp: , Rfl:     Coenzyme Q10 (CoQ-10) 100 MG capsule, Take  by mouth Daily., Disp: , Rfl:     COLLAGEN PO, Take 100 mg by mouth Daily., Disp: , Rfl:     GuaiFENesin (MUCINEX PO), Take 600 mg by mouth As Needed., Disp: , Rfl:     JANUVIA 100 MG tablet, Take 1 tablet by mouth Daily., Disp: , Rfl:     metFORMIN (GLUCOPHAGE) 1000 MG tablet, Take 1 tablet by mouth 2 (Two) Times a Day With Meals., Disp: , Rfl:     montelukast (SINGULAIR) 10 MG tablet, Take 1 tablet by mouth. Take 1 tablet (10 mg) by oral route once daily in the evening, Disp: , Rfl:     Multiple Vitamins-Minerals (OCUVITE ADULT 50+ PO), Take 1 tablet by mouth Daily., Disp: , Rfl:     Omega-3 Fatty Acids (OMEGA 3 PO), Take  by mouth Daily. Omega 3 350-400 mg oral capsule, Disp: , Rfl:     propranolol (INDERAL) 80 MG tablet, Take 1 tablet by mouth 2 (Two) Times a Day., Disp: , Rfl:     RESVERATROL PO, Take 100 mg by mouth daily., Disp: , Rfl:     Saw Palmetto, Serenoa repens, (SAW PALMETTO PO), Take 450 mg by mouth Daily., Disp: , Rfl:     simvastatin (ZOCOR) 40 MG tablet, Take 1 tablet by mouth Every Night., Disp: , Rfl:     Turmeric 500 MG capsule, Take 1 capsule by mouth Daily., Disp: , Rfl:     zinc gluconate 50 MG tablet, Take  by mouth 2 (Two) Times a Day., Disp: , Rfl:     Objective     Vital Signs:  Temp:  [97 °F (36.1 °C)] 97 °F (36.1 °C)    Physical Exam:  Physical Exam  Vitals reviewed.   Constitutional:       General: He is not in acute distress.     Appearance: He is well-developed.   HENT:      Head: Normocephalic and atraumatic.      Right Ear: External ear normal.      Left Ear: External ear normal.      Nose:        Mouth/Throat:      Lips: Pink.   Eyes:      General: Lids are normal.   Pulmonary:      Effort: Pulmonary effort is normal. No respiratory distress.      Breath sounds: No stridor.   Musculoskeletal:      Cervical back: Neck supple.   Neurological:      Mental Status: He is  alert and oriented to person, place, and time.   Psychiatric:         Mood and Affect: Mood normal.         Speech: Speech normal.         Behavior: Behavior normal. Behavior is cooperative.         Data Review:  I have personally reviewed the pathology report demonstrating basal cell carcinoma of the left nasal alar groove:      Operative plan:        Assessment   1. Basal cell carcinoma (BCC) of nasolabial groove        Plan     I have offered excision of the basal cell carcinoma of the left nasal alar groove with likely  V-Y advancement flap closure closure in the office under local anesthesia. I discussed this will likely blunt the alar-facial crease.    Discussion of skin lesion. Discussed risks, benefits, alternatives, and possible complications of excision of the skin lesion with reconstruction utilizing local tissue rearrangement, full-thickness skin grafting, or local interpolated flaps. Risks include, but are not limited too: bleeding, infection, hematoma, recurrence, need for additional procedures, flap failure, cosmetic deformity. Patient understands risks and would like to proceed with surgery.     My findings and recommendations were discussed and questions were answered.     Damien Titus MD  01/10/24  11:59 CST

## 2024-02-26 ENCOUNTER — PROCEDURE VISIT (OUTPATIENT)
Age: 84
End: 2024-02-26
Payer: MEDICARE

## 2024-02-26 VITALS
RESPIRATION RATE: 20 BRPM | TEMPERATURE: 98 F | BODY MASS INDEX: 33.6 KG/M2 | DIASTOLIC BLOOD PRESSURE: 75 MMHG | HEART RATE: 69 BPM | SYSTOLIC BLOOD PRESSURE: 139 MMHG | WEIGHT: 240 LBS | HEIGHT: 71 IN

## 2024-02-26 DIAGNOSIS — C44.311 BASAL CELL CARCINOMA (BCC) OF NASOLABIAL GROOVE: Primary | ICD-10-CM

## 2024-02-26 PROCEDURE — 88305 TISSUE EXAM BY PATHOLOGIST: CPT | Performed by: OTOLARYNGOLOGY

## 2024-02-26 PROCEDURE — 88331 PATH CONSLTJ SURG 1 BLK 1SPC: CPT | Performed by: OTOLARYNGOLOGY

## 2024-02-26 PROCEDURE — 14060 TIS TRNFR E/N/E/L 10 SQ CM/<: CPT | Performed by: OTOLARYNGOLOGY

## 2024-02-26 NOTE — PROGRESS NOTES
PATIENT NAME: Chepe Acevedo    DATE:  02/26/24    PREOPERATIVE DIAGNOSIS: Basal cell carcinoma of the left upper lip and nose    POSTOPERATIVE DIAGNOSIS: Basal cell carcinoma of the left upper lip and nose    PROCEDURE:  1) excision of malignant neoplasm of skin of left upper lip and nose, 9 mm x 10 mm   2) local tissue rearrangement (subcutaneously-pedicled V-Y flap ) skin of left upper lip and nose, 10 mm x 2.5 cm    SURGEON:  Damien Titus MD, FACS    FACILITY: [facility] Office Procedure Room    ANESTHESIA:  Local with 4 cc 1% lidocaine and 1:100,000 epinephrine    DICTATED BY:  Damien Titus MD, FACS    IVF: None    IMPLANTS: None    DRAINS: None    SPECIMENS: Basal cell carcinoma of the left nasolabial crease, stitch at 12:00-frozen    EBL: 50 cc    COMPLICATIONS: None apparent    INDICATIONS FOR SURGERY: Mr. Chaudhry presented with a biopsy-proven basal cell carcinoma of the left nasolabial crease.  He opted for surgical excision and reconstruction.    OPERATIVE FINDINGS:   Lesion: 3 mm x 4 mm   Margins:  3 mm  Defect: 9 mm x 10 mm  Flap and Defect: 10 mm x 2.5 cm  Depth: Through dermis and including a small amount of subcutaneous fat        OPERATIVE DETAILS:     After patient verification consent material was reviewed, the patient was taken to the procedure room and laid supine on the procedure table.  The skin at the area was cleansed with alcohol, the area marked, the patient was then handed a mirror where we reviewed the intended excision, and verified the consent.  This served as the formal timeout procedure.  The skin was  infiltrated with 1% lidocaine with 1-100,000 epinephrine.    After approximately 15 minutes, the skin was tested for anesthesia and deemed appropriate.  The skin was cleansed with Hibiclense and the patient was draped with sterile towels.    With the patient in the supine position, the lesion was measured, appropriate margins (as listed above) were drawn, and this was  converted into a subcutaneous-pedicled V-Y flap design.    The skin of the area was then infiltrated with 1% lidocaine with 1-100,000 epinephrine.    After approximately 15 minutes, the skin was tested for anesthesia and deemed appropriate.    The patient was sterilely prepped with Hibiclens, and sterilely draped.    The skin surrounding the lesion that was previously marked and the flap design was incised utilizing a fresh 15 blade.  This was undermined in the immediate subcutaneous plane.  I placed a stitch at 12:00, and sent this for frozen pathology.  Hemostasis was obtained with bipolar cautery set at 15.    Frozen section analysis demonstrated clear peripheral and deep margins.    Utilizing a fresh 15 blade, the flap incisions were then created.  The flap was then undermined laterally in the immediate subcutaneous plane utilizing retraction with skin hooks and dissection with the curved iris scissors.  This maintained the deep pedicle, from the underlying musculature.  Hemostasis was again obtained utilizing bipolar cautery set at 15.    The flap was then advanced, and closed utilizing deep 5-0 undyed Monocryl in the nasal area, and 5-0 undyed Vicryl suture in the left upper lip.  The skin was further closed utilizing running, locking 6-0 Prolene.      Antibiotic ointment was placed to the incisions and the flaps.      DISPOSITION:  The procedures were completed without complication and tolerated well.  The patient was released in the company of his wife to return home in satisfactory condition.  A follow-up appointment has been scheduled, routine post-op medications prescribed (if required), and post-op instructions were given to the responsible party.           Damien Titus MD, FACS  Board Certified Facial Plastic and Reconstructive Surgery  Board Certified Otolaryngology -- Head and Neck Surgery    Electronically signed by Damien Titus MD, 02/26/24, 12:28 PM CST.

## 2024-02-26 NOTE — PATIENT INSTRUCTIONS
[facility], Post-Procedure Instructions:    Protect the incisions from sunlight. Sunlight to the incisions will cause permanent pigmentation to the incision line and make the incision more noticeable. After the incision has reepithelialized (typically 2-3 weeks after the procedure), you may begin to use sunscreen with an SPF of 15 or greater    For the first week after your procedure, apply a thin coat of Vaseline to the incision 3-4 times daily.  Starting the second week, use a silicone-based wound cream to the incisions to optimize the end result. Apply topically twice daily, or as directed, to help optimize wound healing and decrease redness.  Should the area need to be cleaned, gently apply peroxide on a Q-tip to the area.  Do not clean the area more than once daily with peroxide, as it can delay wound healing.    Avoid getting water on the surgical site for 3 days.  On day 4, you may briefly get the surgical site with clean water, but avoid soapy water to the area for 1 week.      Due to COVID-19, we have decreased the amount of postoperative checks to help prevent added exposure to the virus.  If you have any questions or concerns following her procedure, please give us a call.  We have the ability to schedule a video visit, phone visit, or follow-up visit to address any concerns, while decreasing your exposure to the hospital.  Many of your questions and concerns may be able to be answered over the phone.  Our direct line is [phone].    It is very important to continue routine skin checks with a dermatologist or your PCP every 6-12 months.        CONTACT INFORMATION:  The main office phone number is [phone]. For emergencies after hours and on weekends, this number will convert over to our answering service and the on call provider will answer. Please try to keep non emergent phone calls/ questions to office hours 9am-5pm Monday through Friday.     [employer]  As an alternative, you can  sign up and use the Epic MyChart system for more direct and quicker access for non emergent questions/ problems.  [facility] MobileHelp allows you to send messages to your doctor, view your test results, renew your prescriptions, schedule appointments, and more. To sign up, go to [email or web address].    If you have questions, you can email [email or web address] or call [phone] to talk to our MobileHelp staff. Remember, MobileHelp is NOT to be used for urgent needs. For medical emergencies, dial 911.

## 2024-03-05 ENCOUNTER — OFFICE VISIT (OUTPATIENT)
Age: 84
End: 2024-03-05
Payer: MEDICARE

## 2024-03-05 VITALS
TEMPERATURE: 98 F | HEIGHT: 71 IN | BODY MASS INDEX: 33.6 KG/M2 | WEIGHT: 240 LBS | SYSTOLIC BLOOD PRESSURE: 146 MMHG | HEART RATE: 85 BPM | DIASTOLIC BLOOD PRESSURE: 82 MMHG | RESPIRATION RATE: 20 BRPM

## 2024-03-05 DIAGNOSIS — C44.311 BASAL CELL CARCINOMA (BCC) OF NASOLABIAL GROOVE: Primary | ICD-10-CM

## 2024-03-05 PROCEDURE — 1160F RVW MEDS BY RX/DR IN RCRD: CPT | Performed by: NURSE PRACTITIONER

## 2024-03-05 PROCEDURE — 1159F MED LIST DOCD IN RCRD: CPT | Performed by: NURSE PRACTITIONER

## 2024-03-05 PROCEDURE — 3077F SYST BP >= 140 MM HG: CPT | Performed by: NURSE PRACTITIONER

## 2024-03-05 PROCEDURE — 3079F DIAST BP 80-89 MM HG: CPT | Performed by: NURSE PRACTITIONER

## 2024-03-05 PROCEDURE — 99024 POSTOP FOLLOW-UP VISIT: CPT | Performed by: NURSE PRACTITIONER

## 2024-03-05 NOTE — PROGRESS NOTES
URIEL Grewal     PRIMARY CARE PROVIDER: Irene Griffiths APRN  REFERRING PROVIDER: No ref. provider found    Chief Complaint   Patient presents with    Post-op     Post-op suture removal       Subjective   History of Present Illness:  Chepe Acevedo is a  83 y.o. male  who presents for follow up s/p excision of a basal cell carcinoma of the left upper lip and nose with pedicled V to Y flap on 2/26/2024.  Postoperatively, he has been doing well.  He has had tenderness to the surgical site and it has been irritated by his CPAP.  He denies fever, chills, bleeding, or drainage.    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    2.) Excision of a basal cell carcinoma of the left nasal supratip with the east-West flap reconstruction, and excision of basal cell carcinoma of the left auricular helix with full-thickness skin graft reconstruction on September 21, 2022.          Dermatology: Behzad    Review of Systems:  Review of Systems   Constitutional:  Negative for chills, fatigue, fever and unexpected weight change.   HENT:  Negative for facial swelling.    Respiratory:  Negative for cough, chest tightness and shortness of breath.    Cardiovascular:  Negative for chest pain.   Musculoskeletal:  Negative for neck pain.   Skin:  Negative for color change and wound.   Neurological:  Positive for speech difficulty and weakness. Negative for facial asymmetry.   Hematological:  Negative for adenopathy. Bruises/bleeds easily.       Past History:  Past Medical History:   Diagnosis Date    Arthritis     Arthritis     Asthma     Basal cell carcinoma of skin of face     BCC (basal cell carcinoma)     nasal dorsum    Benign hypertrophy of prostate     Cataract     COPD (chronic obstructive pulmonary disease)     COPD (chronic obstructive pulmonary disease)      "Diabetes     Diabetes mellitus     TYPE 2    Elevated cholesterol     Hypertension     Hypertension     Peripheral neuropathy     Postural dizziness     Skin cancer of face     Sleep apnea in adult     Tremors of nervous system     \"essential tremors\"     Past Surgical History:   Procedure Laterality Date    ARM LESION/CYST EXCISION Left 06/28/2021    Procedure: WIDE EXCISION SKIN LESION LEFT SHOULDER;  Surgeon: Marisol Conner MD;  Location:  [facility] OR;  Service: General;  Laterality: Left;    BASAL CELL CARCINOMA EXCISION  08/03/2014    BRAIN SURGERY      CATARACT EXTRACTION W/ INTRAOCULAR LENS  IMPLANT, BILATERAL Bilateral     CHOLECYSTECTOMY      EYE SURGERY      cataract    HEAD/NECK LESION/CYST EXCISION Left 09/26/2016    Procedure: EXCISION LESION left ear and left temple BASAL CELL W/ F/S;  Surgeon: Ashok Harden MD;  Location:  [facility];  Service:     OTHER SURGICAL HISTORY      Shoulder    OTHER SURGICAL HISTORY      Thumb Surgery    SINUS SURGERY      SKIN LESION EXCISION Right 12/01/2022    Procedure: Excision of 3 Skin Cancers of the Right Cheek with Reconstruction;  Surgeon: Damien Titus MD;  Location:  [facility];  Service: ENT;  Laterality: Right;    TOTAL SHOULDER ARTHROPLASTY Right     TRIGGER FINGER RELEASE      LEFT THUMB     Family History   Problem Relation Age of Onset    Heart disease Father     Arthritis Mother     Cancer Sister      Social History     Tobacco Use    Smoking status: Never    Smokeless tobacco: Never   Vaping Use    Vaping status: Never Used   Substance Use Topics    Alcohol use: Never    Drug use: No     Allergies:  Chlor-trimeton [chlorpheniramine], Ethylenediamine, Latex, Topiramate, Clortermine, and Silicone    Current Outpatient Medications:     aspirin 81 MG tablet, Take 1 tablet by mouth Daily. Delayed Release (DR/EC), Disp: , Rfl:     AZELASTINE HCL NA, into the nostril(s) as directed by provider As Needed. Azelastine, Disp: , Rfl:     Biotin 5 MG capsule, " Take  by mouth Daily., Disp: , Rfl:     budesonide (PULMICORT) 0.25 MG/2ML nebulizer solution, Take 2 mL by nebulization Daily., Disp: , Rfl:     Cholecalciferol (VITAMIN D3 PO), Take 5,000 Int'l Units by mouth daily., Disp: , Rfl:     CINNAMON PO, Take 1,000 mg by mouth daily., Disp: , Rfl:     citalopram (CeleXA) 10 MG tablet, Take 1 tablet by mouth Daily., Disp: , Rfl:     clopidogrel (PLAVIX) 75 MG tablet, Take 1 tablet by mouth Daily., Disp: , Rfl:     Coenzyme Q10 (CoQ-10) 100 MG capsule, Take  by mouth Daily., Disp: , Rfl:     COLLAGEN PO, Take 100 mg by mouth Daily., Disp: , Rfl:     GuaiFENesin (MUCINEX PO), Take 600 mg by mouth As Needed., Disp: , Rfl:     JANUVIA 100 MG tablet, Take 1 tablet by mouth Daily., Disp: , Rfl:     metFORMIN (GLUCOPHAGE) 1000 MG tablet, Take 1 tablet by mouth 2 (Two) Times a Day With Meals., Disp: , Rfl:     montelukast (SINGULAIR) 10 MG tablet, Take 1 tablet by mouth. Take 1 tablet (10 mg) by oral route once daily in the evening, Disp: , Rfl:     Multiple Vitamins-Minerals (OCUVITE ADULT 50+ PO), Take 1 tablet by mouth Daily., Disp: , Rfl:     Omega-3 Fatty Acids (OMEGA 3 PO), Take  by mouth Daily. Omega 3 350-400 mg oral capsule, Disp: , Rfl:     propranolol (INDERAL) 80 MG tablet, Take 1 tablet by mouth 2 (Two) Times a Day., Disp: , Rfl:     RESVERATROL PO, Take 100 mg by mouth daily., Disp: , Rfl:     Saw Palmetto, Serenoa repens, (SAW PALMETTO PO), Take 450 mg by mouth Daily., Disp: , Rfl:     simvastatin (ZOCOR) 40 MG tablet, Take 1 tablet by mouth Every Night., Disp: , Rfl:     Turmeric 500 MG capsule, Take 1 capsule by mouth Daily., Disp: , Rfl:     zinc gluconate 50 MG tablet, Take  by mouth 2 (Two) Times a Day., Disp: , Rfl:     mupirocin (BACTROBAN) 2 % ointment, Apply 1 Application topically to the appropriate area as directed 3 (Three) Times a Day for 7 days., Disp: 22 g, Rfl: 0      Objective     Vital Signs:  Temp:  [98 °F (36.7 °C)] 98 °F (36.7 °C)  Heart Rate:   "[85] 85  Resp:  [20] 20  BP: (146)/(82) 146/82 /82   Pulse 85   Temp 98 °F (36.7 °C)   Resp 20   Ht 180.3 cm (71\")   Wt 109 kg (240 lb)   BMI 33.47 kg/m²     Physical Exam:  Physical Exam  Vitals reviewed.   Constitutional:       General: He is not in acute distress.     Appearance: He is well-developed.   HENT:      Head: Normocephalic and atraumatic.        Right Ear: Tympanic membrane, ear canal and external ear normal.      Left Ear: External ear normal.      Mouth/Throat:      Lips: Pink.   Eyes:      General: Lids are normal.   Pulmonary:      Effort: Pulmonary effort is normal. No respiratory distress.      Breath sounds: No stridor.   Musculoskeletal:      Cervical back: Neck supple.   Neurological:      Mental Status: He is alert and oriented to person, place, and time.   Psychiatric:         Mood and Affect: Mood normal.         Speech: Speech normal.         Behavior: Behavior normal. Behavior is cooperative.       Pathology reviewed:        Assessment   Assessment:  1. Basal cell carcinoma (BCC) of nasolabial groove          Plan   Plan:    In order to optimize wound healing, it is beneficial to protect the incisions from sunlight for the first year after the procedure.  Sunlight exposure may cause a brown-red discoloration to the incisions, making them more obvious.  Use a sunscreen with titanium dioxide and/or zinc oxide as the active ingredient(s).  Utilize an SPF factor of at least 15.    Use an OTC silicone-based scar cream to the incision daily to optimize wound healing.    He sees Dr. Shayy Harden every 6 months for routine skin exams and cancer surveillance.    There are no Patient Instructions on file for this visit.  Return in about 6 months (around 9/5/2024), or if symptoms worsen or fail to improve, for Recheck.    My findings and recommendations were discussed and questions were answered.     Annie Soliz, URIEL    "

## 2024-08-12 ENCOUNTER — OFFICE VISIT (OUTPATIENT)
Dept: CARDIOLOGY CLINIC | Age: 84
End: 2024-08-12
Payer: MEDICARE

## 2024-08-12 VITALS
DIASTOLIC BLOOD PRESSURE: 76 MMHG | BODY MASS INDEX: 33.93 KG/M2 | SYSTOLIC BLOOD PRESSURE: 132 MMHG | HEIGHT: 73 IN | WEIGHT: 256 LBS | OXYGEN SATURATION: 93 % | HEART RATE: 68 BPM

## 2024-08-12 DIAGNOSIS — G25.0 ESSENTIAL TREMOR: ICD-10-CM

## 2024-08-12 DIAGNOSIS — E11.69 TYPE 2 DIABETES MELLITUS WITH OTHER SPECIFIED COMPLICATION, WITHOUT LONG-TERM CURRENT USE OF INSULIN (HCC): ICD-10-CM

## 2024-08-12 DIAGNOSIS — G47.33 OBSTRUCTIVE SLEEP APNEA: ICD-10-CM

## 2024-08-12 DIAGNOSIS — E78.5 DYSLIPIDEMIA: ICD-10-CM

## 2024-08-12 DIAGNOSIS — I10 ESSENTIAL HYPERTENSION: ICD-10-CM

## 2024-08-12 DIAGNOSIS — I25.10 CORONARY ARTERY DISEASE INVOLVING NATIVE CORONARY ARTERY OF NATIVE HEART WITHOUT ANGINA PECTORIS: Primary | ICD-10-CM

## 2024-08-12 PROCEDURE — 3075F SYST BP GE 130 - 139MM HG: CPT | Performed by: CLINICAL NURSE SPECIALIST

## 2024-08-12 PROCEDURE — 1123F ACP DISCUSS/DSCN MKR DOCD: CPT | Performed by: CLINICAL NURSE SPECIALIST

## 2024-08-12 PROCEDURE — G8427 DOCREV CUR MEDS BY ELIG CLIN: HCPCS | Performed by: CLINICAL NURSE SPECIALIST

## 2024-08-12 PROCEDURE — 3078F DIAST BP <80 MM HG: CPT | Performed by: CLINICAL NURSE SPECIALIST

## 2024-08-12 PROCEDURE — G8417 CALC BMI ABV UP PARAM F/U: HCPCS | Performed by: CLINICAL NURSE SPECIALIST

## 2024-08-12 PROCEDURE — 99214 OFFICE O/P EST MOD 30 MIN: CPT | Performed by: CLINICAL NURSE SPECIALIST

## 2024-08-12 PROCEDURE — 1036F TOBACCO NON-USER: CPT | Performed by: CLINICAL NURSE SPECIALIST

## 2024-08-12 ASSESSMENT — ENCOUNTER SYMPTOMS
CHEST TIGHTNESS: 0
NAUSEA: 0
WHEEZING: 0
SHORTNESS OF BREATH: 0
ABDOMINAL PAIN: 0
COUGH: 0
FACIAL SWELLING: 0
EYE REDNESS: 0
VOMITING: 0

## 2024-08-12 NOTE — PATIENT INSTRUCTIONS
Return in about 9 months (around 5/12/2025) for APRN.  Keep diabetes under control to help prevent further heart disease.  Keep Hemoglobin A1C less than 7%.     Fall precuations- use walker at all times to prevent falls

## 2024-08-12 NOTE — PROGRESS NOTES
LUIS MONTES 2/[birth year]/21  Conclusions      Summary   Dobutamine stress echocardiogram with electrocardiographic and   echocardiographic evidence of myocardial ischemia. 1-2 mm horizontal ST   segment depression with dobutamine infusion and an appearance of global   hypokinesis concerning for possible global ischemia.      Recommendation      Signature      ----------------------------------------------------------------   Electronically signed by Rosendo Slade DO(Interpreting   physician) on 08/18/2021 04:41 PM        Assessment:     Diagnosis Orders   1. Coronary artery disease involving native coronary artery of native heart without angina pectoris        2. Essential hypertension        3. Dyslipidemia        4. Essential tremor        5. Type 2 diabetes mellitus with other specified complication, without long-term current use of insulin ([facility])        6. Obstructive sleep apnea              CAD-stable without symptoms of angina.  Status post drug-eluting stents in February 2022.  Continue aspirin, propranolol, simvastatin    Hypertension-stable on current regimen with propranolol    Dyslipidemia-stable on simvastatin with last LDL 46 on 1/26/2022.  We will request most recent labs from PCP office and I have asked him to forward cholesterol labs as he has them done to [facility] cardiology    Essential tremor-brain stimulator placed 2023. He has had some improvements in his ADLs, but adjustments with the device had taken quite some time.  He continues to follow with  neurology.  He states his disease is not so much Parkinson's as it is essential tremors.  Fall precautions    Diabetes- stable per patient report, last A1c unknown.  We discussed A1c goal less than 7%.  He uses a continuous glucose monitor    Obstructive sleep apnea-stable and wears sleep equipment regularly    Stable cardiovascular status. No evidence of overt heart failure,angina or dysrhythmia.     Plan    Return in about 9 months (around 5/12/2025)

## 2024-09-09 ENCOUNTER — TELEPHONE (OUTPATIENT)
Dept: CARDIOLOGY CLINIC | Age: 84
End: 2024-09-09

## 2024-09-09 ENCOUNTER — OFFICE VISIT (OUTPATIENT)
Age: 84
End: 2024-09-09
Payer: MEDICARE

## 2024-09-09 VITALS
TEMPERATURE: 97 F | DIASTOLIC BLOOD PRESSURE: 80 MMHG | SYSTOLIC BLOOD PRESSURE: 128 MMHG | HEART RATE: 60 BPM | OXYGEN SATURATION: 98 %

## 2024-09-09 DIAGNOSIS — Z85.828 ENCOUNTER FOR FOLLOW-UP SURVEILLANCE OF SKIN CANCER: ICD-10-CM

## 2024-09-09 DIAGNOSIS — C44.311 BASAL CELL CARCINOMA (BCC) OF NASOLABIAL GROOVE: Primary | ICD-10-CM

## 2024-09-09 DIAGNOSIS — Z08 ENCOUNTER FOR FOLLOW-UP SURVEILLANCE OF SKIN CANCER: ICD-10-CM

## 2024-09-09 PROCEDURE — 3074F SYST BP LT 130 MM HG: CPT | Performed by: NURSE PRACTITIONER

## 2024-09-09 PROCEDURE — 3079F DIAST BP 80-89 MM HG: CPT | Performed by: NURSE PRACTITIONER

## 2024-09-09 PROCEDURE — 99213 OFFICE O/P EST LOW 20 MIN: CPT | Performed by: NURSE PRACTITIONER

## 2024-09-09 PROCEDURE — 1160F RVW MEDS BY RX/DR IN RCRD: CPT | Performed by: NURSE PRACTITIONER

## 2024-09-09 PROCEDURE — 1159F MED LIST DOCD IN RCRD: CPT | Performed by: NURSE PRACTITIONER

## 2024-09-09 NOTE — PROGRESS NOTES
URIEL Grewal     PRIMARY CARE PROVIDER: Irene Griffiths APRN  REFERRING PROVIDER: No ref. provider found    Chief Complaint   Patient presents with    Follow-up     Follow up on exc of basal cell carcinoma (BCC) of nasolabial groove         Subjective   History of Present Illness:  Chepe Acevedo is a  84 y.o. male who presents for follow up s/p excision of a basal cell carcinoma of the left upper lip and nose with pedicled V to Y flap on 2/26/2024.  Postoperatively, he has been doing well.  He denies any related complaints.  He has a new skin lesion on his right forearm and is scheduled to see [facility] in the next few weeks.    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    2.) Excision of a basal cell carcinoma of the left nasal supratip with the east-West flap reconstruction, and excision of basal cell carcinoma of the left auricular helix with full-thickness skin graft reconstruction on September 21, 2022.          Dermatology: [facility]    Review of Systems:  Review of Systems   Constitutional:  Negative for chills, fatigue, fever and unexpected weight change.   HENT:  Negative for facial swelling.    Respiratory:  Negative for cough, chest tightness and shortness of breath.    Cardiovascular:  Negative for chest pain.   Musculoskeletal:  Negative for neck pain.   Skin:  Negative for color change and wound.   Neurological:  Positive for speech difficulty and weakness. Negative for facial asymmetry.   Hematological:  Negative for adenopathy. Bruises/bleeds easily.       Past History:  Past Medical History:   Diagnosis Date    Arthritis     Arthritis     Asthma     Basal cell carcinoma of skin of face     BCC (basal cell carcinoma)     nasal dorsum    Benign hypertrophy of prostate     Cataract     COPD (chronic obstructive  "pulmonary disease)     COPD (chronic obstructive pulmonary disease)     Diabetes     Diabetes mellitus     TYPE 2    Elevated cholesterol     Hypertension     Hypertension     Peripheral neuropathy     Postural dizziness     Skin cancer of face     Sleep apnea in adult     Tremors of nervous system     \"essential tremors\"     Past Surgical History:   Procedure Laterality Date    ARM LESION/CYST EXCISION Left 06/28/2021    Procedure: WIDE EXCISION SKIN LESION LEFT SHOULDER;  Surgeon: Marisol Conner MD;  Location:  [facility] OR;  Service: General;  Laterality: Left;    BASAL CELL CARCINOMA EXCISION  08/03/2014    BRAIN SURGERY      CATARACT EXTRACTION W/ INTRAOCULAR LENS  IMPLANT, BILATERAL Bilateral     CHOLECYSTECTOMY      EYE SURGERY      cataract    HEAD/NECK LESION/CYST EXCISION Left 09/26/2016    Procedure: EXCISION LESION left ear and left temple BASAL CELL W/ F/S;  Surgeon: Ashok Harden MD;  Location:  [facility] OR;  Service:     OTHER SURGICAL HISTORY      Shoulder    OTHER SURGICAL HISTORY      Thumb Surgery    SINUS SURGERY      SKIN LESION EXCISION Right 12/01/2022    Procedure: Excision of 3 Skin Cancers of the Right Cheek with Reconstruction;  Surgeon: Damien Titus MD;  Location:  [facility] OR;  Service: ENT;  Laterality: Right;    TOTAL SHOULDER ARTHROPLASTY Right     TRIGGER FINGER RELEASE      LEFT THUMB     Family History   Problem Relation Age of Onset    Heart disease Father     Arthritis Mother     Cancer Sister      Social History     Tobacco Use    Smoking status: Never    Smokeless tobacco: Never   Vaping Use    Vaping status: Never Used   Substance Use Topics    Alcohol use: Never    Drug use: No     Allergies:  Chlor-trimeton [chlorpheniramine], Ethylenediamine, Latex, Topiramate, Clortermine, and Silicone    Current Outpatient Medications:     aspirin 81 MG tablet, Take 1 tablet by mouth Daily. Delayed Release (DR/EC), Disp: , Rfl:     AZELASTINE HCL NA, into the nostril(s) as directed by " provider As Needed. Azelastine, Disp: , Rfl:     Biotin 5 MG capsule, Take  by mouth Daily., Disp: , Rfl:     budesonide (PULMICORT) 0.25 MG/2ML nebulizer solution, Take 2 mL by nebulization Daily., Disp: , Rfl:     Cholecalciferol (VITAMIN D3 PO), Take 5,000 Int'l Units by mouth daily., Disp: , Rfl:     CINNAMON PO, Take 1,000 mg by mouth daily., Disp: , Rfl:     citalopram (CeleXA) 10 MG tablet, Take 1 tablet by mouth Daily., Disp: , Rfl:     Coenzyme Q10 (CoQ-10) 100 MG capsule, Take  by mouth Daily., Disp: , Rfl:     COLLAGEN PO, Take 100 mg by mouth Daily., Disp: , Rfl:     GuaiFENesin (MUCINEX PO), Take 600 mg by mouth As Needed., Disp: , Rfl:     JANUVIA 100 MG tablet, Take 1 tablet by mouth Daily., Disp: , Rfl:     metFORMIN (GLUCOPHAGE) 1000 MG tablet, Take 1 tablet by mouth 2 (Two) Times a Day With Meals., Disp: , Rfl:     montelukast (SINGULAIR) 10 MG tablet, Take 1 tablet by mouth. Take 1 tablet (10 mg) by oral route once daily in the evening, Disp: , Rfl:     Multiple Vitamins-Minerals (OCUVITE ADULT 50+ PO), Take 1 tablet by mouth Daily., Disp: , Rfl:     Omega-3 Fatty Acids (OMEGA 3 PO), Take  by mouth Daily. Omega 3 350-400 mg oral capsule, Disp: , Rfl:     propranolol (INDERAL) 80 MG tablet, Take 1 tablet by mouth 2 (Two) Times a Day., Disp: , Rfl:     Saw Palmetto, Serenoa repens, (SAW PALMETTO PO), Take 450 mg by mouth Daily., Disp: , Rfl:     simvastatin (ZOCOR) 40 MG tablet, Take 1 tablet by mouth Every Night., Disp: , Rfl:     Turmeric 500 MG capsule, Take 1 capsule by mouth Daily., Disp: , Rfl:     zinc gluconate 50 MG tablet, Take  by mouth 2 (Two) Times a Day., Disp: , Rfl:       Objective     Vital Signs:  Temp:  [97 °F (36.1 °C)] 97 °F (36.1 °C)  Heart Rate:  [60] 60  BP: (128)/(80) 128/80 /80   Pulse 60   Temp 97 °F (36.1 °C) (Temporal)   SpO2 98%     Physical Exam:  Physical Exam  Vitals reviewed.   Constitutional:       General: He is not in acute distress.     Appearance: He  is well-developed.   HENT:      Head: Normocephalic and atraumatic.        Right Ear: Tympanic membrane, ear canal and external ear normal.      Left Ear: External ear normal.      Mouth/Throat:      Lips: Pink.   Eyes:      General: Lids are normal.   Pulmonary:      Effort: Pulmonary effort is normal. No respiratory distress.      Breath sounds: No stridor.   Musculoskeletal:      Cervical back: Neck supple.   Neurological:      Mental Status: He is alert and oriented to person, place, and time.   Psychiatric:         Mood and Affect: Mood normal.         Speech: Speech normal.         Behavior: Behavior normal. Behavior is cooperative.       Pathology reviewed:        Assessment   Assessment:  1. Basal cell carcinoma (BCC) of nasolabial groove    2. Encounter for follow-up surveillance of skin cancer            Plan   Plan:    In order to optimize wound healing, it is beneficial to protect the incisions from sunlight for the first year after the procedure.  Sunlight exposure may cause a brown-red discoloration to the incisions, making them more obvious.  Use a sunscreen with titanium dioxide and/or zinc oxide as the active ingredient(s).  Utilize an SPF factor of at least 15.    Use an OTC silicone-based scar cream to the incision daily to optimize wound healing.    He sees Dr. Shayy Harden every 6 months for routine skin exams and cancer surveillance.  He has an upcoming appointment in a few weeks regarding a new skin lesion on his right forearm.    There are no Patient Instructions on file for this visit.  Return if symptoms worsen or fail to improve, for Recheck.    My findings and recommendations were discussed and questions were answered.     URIEL Grewal

## 2025-04-14 NOTE — PROGRESS NOTES
Subjective    Mr. Acevedo is 84 y.o. male    Chief Complaint: BPH with Urge Incontinence and Urinary Hesitancy    History of Present Illness  Benign Prostatic Hypertrophy  Patient complains of lower urinary tract symptoms. He reports incomplete emptying, intermittency, nocturia two times a night, urgency, and weak stream. He denies frequency and straining. Patient states symptoms are of moderate severity. Onset of symptoms was a few months ago and was gradual in onset. His AUA Symptom Score is, 16/35.He reports a history of no complicating symptoms. He denies flank pain, gross hematuria, kidney stones, and recurrent UTI.  Patient has tried new patient is no urologic medications or treatment..  No recent PSA.  No family history of prostate cancer.  Patient also has incontinence with or without urgency      The following portions of the patient's history were reviewed and updated as appropriate: allergies, current medications, past family history, past medical history, past social history, past surgical history and problem list.    Review of Systems   Genitourinary:  Positive for difficulty urinating.        Incontinence   All other systems reviewed and are negative.        Current Outpatient Medications:     aspirin 81 MG tablet, Take 1 tablet by mouth Daily. Delayed Release (DR/EC), Disp: , Rfl:     AZELASTINE HCL NA, into the nostril(s) as directed by provider As Needed. Azelastine, Disp: , Rfl:     budesonide (PULMICORT) 0.25 MG/2ML nebulizer solution, Take 2 mL by nebulization Daily., Disp: , Rfl:     Cholecalciferol (VITAMIN D3 PO), Take 5,000 Int'l Units by mouth daily., Disp: , Rfl:     CINNAMON PO, Take 1,000 mg by mouth daily., Disp: , Rfl:     citalopram (CeleXA) 10 MG tablet, Take 1 tablet by mouth Daily., Disp: , Rfl:     Coenzyme Q10 (CoQ-10) 100 MG capsule, Take  by mouth Daily., Disp: , Rfl:     Farxiga 10 MG tablet, Take 10 mg by mouth Daily., Disp: , Rfl:     fexofenadine (ALLEGRA) 180 MG tablet, Take  1 tablet by mouth Daily As Needed., Disp: , Rfl:     indapamide (LOZOL) 1.25 MG tablet, Take 1 tablet by mouth., Disp: , Rfl:     JANUVIA 100 MG tablet, Take 1 tablet by mouth Daily., Disp: , Rfl:     metFORMIN (GLUCOPHAGE) 1000 MG tablet, Take 1 tablet by mouth 2 (Two) Times a Day With Meals., Disp: , Rfl:     montelukast (SINGULAIR) 10 MG tablet, Take 1 tablet by mouth. Take 1 tablet (10 mg) by oral route once daily in the evening, Disp: , Rfl:     Multiple Vitamins-Minerals (OCUVITE ADULT 50+ PO), Take 1 tablet by mouth Daily., Disp: , Rfl:     olopatadine (PATANOL) 0.1 % ophthalmic solution, Daily., Disp: , Rfl:     Omega-3 Fatty Acids (OMEGA 3 PO), Take  by mouth Daily. Omega 3 350-400 mg oral capsule, Disp: , Rfl:     propranolol (INDERAL) 80 MG tablet, Take 1 tablet by mouth 2 (Two) Times a Day., Disp: , Rfl:     Saw Palmetto, Serenoa repens, (SAW PALMETTO PO), Take 450 mg by mouth Daily., Disp: , Rfl:     simvastatin (ZOCOR) 40 MG tablet, Take 1 tablet by mouth Every Night., Disp: , Rfl:     Turmeric 500 MG capsule, Take 1 capsule by mouth Daily., Disp: , Rfl:     zinc gluconate 50 MG tablet, Take  by mouth 2 (Two) Times a Day., Disp: , Rfl:     Biotin 5 MG capsule, Take  by mouth Daily. (Patient not taking: Reported on 4/30/2025), Disp: , Rfl:     COLLAGEN PO, Take 100 mg by mouth Daily. (Patient not taking: Reported on 4/30/2025), Disp: , Rfl:     GuaiFENesin (MUCINEX PO), Take 600 mg by mouth As Needed. (Patient not taking: Reported on 4/30/2025), Disp: , Rfl:     Past Medical History:   Diagnosis Date    Allergic rhinitis     Arthritis     Arthritis     Asthma     Basal cell carcinoma of skin of face     BCC (basal cell carcinoma)     nasal dorsum    Benign hypertrophy of prostate     Cataract     Clotting disorder ?    COPD (chronic obstructive pulmonary disease)     COPD (chronic obstructive pulmonary disease)     Coronary artery disease 1970    Dental disease 2015    Have had 2 root canals.     "Diabetes     Diabetes mellitus     TYPE 2    Elevated cholesterol     Elevated PSA 1985?    Erectile dysfunction 2020?    Fracture of nasal bones     Headache     Have minor headaches daily. When the headache is debilitating is due to weather.    Hypertension     Hypertension     Nosebleed     Peripheral neuropathy     Postural dizziness     Sinusitis     Have had 5 sinus operations.    Skin cancer of face     Sleep apnea in adult     Tremors of nervous system     \"essential tremors\"    Urinary incontinence        Past Surgical History:   Procedure Laterality Date    ARM LESION/CYST EXCISION Left 06/28/2021    Procedure: WIDE EXCISION SKIN LESION LEFT SHOULDER;  Surgeon: Marisol Conner MD;  Location:  [facility] OR;  Service: General;  Laterality: Left;    BASAL CELL CARCINOMA EXCISION  08/03/2014    BRAIN SURGERY      CATARACT EXTRACTION W/ INTRAOCULAR LENS  IMPLANT, BILATERAL Bilateral     CHOLECYSTECTOMY      COSMETIC SURGERY  2019    Had a Basel Cell Cancer on forehead removed.    EYE SURGERY      cataract    HEAD/NECK LESION/CYST EXCISION Left 09/26/2016    Procedure: EXCISION LESION left ear and left temple BASAL CELL W/ F/S;  Surgeon: Ashok Harden MD;  Location:  [facility] OR;  Service:     OTHER SURGICAL HISTORY      Shoulder    OTHER SURGICAL HISTORY      Thumb Surgery    SINUS SURGERY      SKIN LESION EXCISION Right 12/01/2022    Procedure: Excision of 3 Skin Cancers of the Right Cheek with Reconstruction;  Surgeon: Damien Titus MD;  Location:  [facility] OR;  Service: ENT;  Laterality: Right;    TOTAL SHOULDER ARTHROPLASTY Right     TRIGGER FINGER RELEASE      LEFT THUMB       Social History     Socioeconomic History    Marital status:    Tobacco Use    Smoking status: Never    Smokeless tobacco: Never    Tobacco comments:     As an athlete, 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    Drug use: No    Sexual activity: Not Currently     Partners: Female       Family History   Problem Relation Age of Onset    Heart disease Father     Hypertension Father     Heart failure Father     Arthritis Mother     Cancer Sister        Objective    Temp 97.4 °F (36.3 °C)   Ht 184.2 cm (72.5\")   Wt 109 kg (240 lb)   BMI 32.10 kg/m²     Physical Exam        Results for orders placed or performed in visit on 04/30/25   POC Urinalysis Dipstick, Multipro    Collection Time: 04/30/25  9:22 AM    Specimen: Urine   Result Value Ref Range    Color Yellow Yellow, Straw, Dark Yellow, Brittney    Clarity, UA Clear Clear    Glucose, UA >=1000 mg/dL (3+) (A) Negative mg/dL    Bilirubin Negative Negative    Ketones, UA Negative Negative    Specific Gravity  1.015 1.005 - 1.030    Blood, UA Negative Negative    pH, Urine 7.0 5.0 - 8.0    Protein, POC Negative Negative mg/dL    Urobilinogen, UA 0.2 E.U./dL Normal, 0.2 E.U./dL    Nitrite, UA Negative Negative    Leukocytes Negative Negative   Estimation of residual urine via abdominal ultrasound  Residual Urine: 201 ml  Indication: BPH with Urge Incontinence and Urinary Hesitancy  Position: Supine  Examination: Incremental scanning of the suprapubic area using 3 MHz transducer using copious amounts of acoustic gel.   Findings: An anechoic area was demonstrated which represented the bladder, with measurement of residual urine as noted. I inspected this myself. In that the residual urine was stable or insignificant, no treatment will be necessary at this time.   IPSS Questionnaire (AUA-7):  Incomplete emptying  Over the past month, how often have you had a sensation of not emptying your bladder completely after you finished urinating?: Less than half the time (04/30/25 0906)  Frequency  Over the past month, how often have you had to urinate again less than two hours after you finishied urinating ?: Not at all (04/30/25 0906)  Intermittency  Over the " past month, how often have you found you stopped and started again several time when you urinated ?: Less than half the time (04/30/25 0906)  Urgency  Over the last month, how often have you found it difficult  have you found it difficult to postpone urination ?: Almost always (04/30/25 0906)  Weak Stream  Over the past month, how often have you had a weak urinary stream ?: Almost always (04/30/25 0906)  Straining  Over the past month, how often have you had to push or strain to begin urination ?: Not at all (04/30/25 0906)  Nocturia  Over the past month, how many times did you most typically get up to urinate from the time you went to bed until the time you got up in the morning ?: 2 times (04/30/25 0906)  Quality of life due to urinary symptoms  If you were to spend the rest of your life with your urinary condition the way it is now, how would feel about that?: Mostly satisfied (04/30/25 0906)    Scores  Total IPSS Score: (!) 16 (04/30/25 0906)  Total Score = Symptomatic Level: Moderately symptomatic: 8-19 (04/30/25 0906)         Assessment and Plan    Diagnoses and all orders for this visit:    1. Benign prostatic hyperplasia with urinary hesitancy (Primary)  -     POC Urinalysis Dipstick, Multipro    2. Urge incontinence  -     POC Urinalysis Dipstick, Multipro      Patient presents as a new patient to [facility] urology he has had gradually worsening voiding symptoms this past year or several months.  He has never been on or taking any urologic medications.  Digital rectal exam was benign.  Bladder scan revealed 210 postvoid residual.    Urine was clear.  I feel he would benefit from an alpha-blocker I started him on tamsulosin 0.4 mg 1 daily to take at nighttime.  I did explain that side effects could include lightheadedness dizziness lowering of blood pressure.    Follow-up in 1 month.

## 2025-04-30 ENCOUNTER — OFFICE VISIT (OUTPATIENT)
Dept: UROLOGY | Facility: CLINIC | Age: 85
End: 2025-04-30
Payer: MEDICARE

## 2025-04-30 VITALS — BODY MASS INDEX: 31.81 KG/M2 | TEMPERATURE: 97.4 F | WEIGHT: 240 LBS | HEIGHT: 73 IN

## 2025-04-30 DIAGNOSIS — R39.11 BENIGN PROSTATIC HYPERPLASIA WITH URINARY HESITANCY: Primary | ICD-10-CM

## 2025-04-30 DIAGNOSIS — N40.1 BENIGN PROSTATIC HYPERPLASIA WITH URINARY HESITANCY: Primary | ICD-10-CM

## 2025-04-30 DIAGNOSIS — N39.41 URGE INCONTINENCE: ICD-10-CM

## 2025-04-30 LAB
BILIRUB BLD-MCNC: NEGATIVE MG/DL
CLARITY, POC: CLEAR
COLOR UR: YELLOW
GLUCOSE UR STRIP-MCNC: ABNORMAL MG/DL
KETONES UR QL: NEGATIVE
LEUKOCYTE EST, POC: NEGATIVE
NITRITE UR-MCNC: NEGATIVE MG/ML
PH UR: 7 [PH] (ref 5–8)
PROT UR STRIP-MCNC: NEGATIVE MG/DL
RBC # UR STRIP: NEGATIVE /UL
SP GR UR: 1.01 (ref 1–1.03)
UROBILINOGEN UR QL: ABNORMAL

## 2025-04-30 RX ORDER — TAMSULOSIN HYDROCHLORIDE 0.4 MG/1
1 CAPSULE ORAL NIGHTLY
Qty: 30 CAPSULE | Refills: 2 | Status: SHIPPED | OUTPATIENT
Start: 2025-04-30 | End: 2025-07-29

## 2025-04-30 RX ORDER — OLOPATADINE HYDROCHLORIDE 1 MG/ML
SOLUTION/ DROPS OPHTHALMIC DAILY
COMMUNITY

## 2025-04-30 RX ORDER — DAPAGLIFLOZIN 10 MG/1
1 TABLET, FILM COATED ORAL DAILY
COMMUNITY

## 2025-04-30 RX ORDER — FEXOFENADINE HCL 180 MG/1
1 TABLET ORAL DAILY PRN
COMMUNITY

## 2025-04-30 RX ORDER — INDAPAMIDE 1.25 MG/1
1.25 TABLET ORAL
COMMUNITY

## 2025-05-06 NOTE — PROGRESS NOTES
Subjective    Mr. Acevedo is 84 y.o. male    Chief Complaint: BPH/Urge Incontinence    History of Present Illness  Here for 1 month follow-up with worsening urinary hesitancy incomplete emptying intermittency urgency and weak stream.  Has not been on any urologic medications up to this point but based on his worsening symptoms I placed him on 0.4 mg tamsulosin.  Patient states he has had an excellent response and is very pleased that is treated all the symptoms he was having.  Denies any bothersome side effects.    The following portions of the patient's history were reviewed and updated as appropriate: allergies, current medications, past family history, past medical history, past social history, past surgical history and problem list.    Review of Systems   Genitourinary:  Positive for difficulty urinating. Negative for dysuria.         Current Outpatient Medications:     aspirin 81 MG tablet, Take 1 tablet by mouth Daily. Delayed Release (DR/EC), Disp: , Rfl:     AZELASTINE HCL NA, into the nostril(s) as directed by provider As Needed. Azelastine, Disp: , Rfl:     Biotin 5 MG capsule, Take  by mouth Daily., Disp: , Rfl:     budesonide (PULMICORT) 0.25 MG/2ML nebulizer solution, Take 2 mL by nebulization Daily., Disp: , Rfl:     Cholecalciferol (VITAMIN D3 PO), Take 5,000 Int'l Units by mouth daily., Disp: , Rfl:     CINNAMON PO, Take 1,000 mg by mouth daily., Disp: , Rfl:     citalopram (CeleXA) 10 MG tablet, Take 1 tablet by mouth Daily., Disp: , Rfl:     Coenzyme Q10 (CoQ-10) 100 MG capsule, Take  by mouth Daily., Disp: , Rfl:     COLLAGEN PO, Take 100 mg by mouth Daily., Disp: , Rfl:     Farxiga 10 MG tablet, Take 10 mg by mouth Daily., Disp: , Rfl:     fexofenadine (ALLEGRA) 180 MG tablet, Take 1 tablet by mouth Daily As Needed., Disp: , Rfl:     GuaiFENesin (MUCINEX PO), Take 600 mg by mouth As Needed., Disp: , Rfl:     indapamide (LOZOL) 1.25 MG tablet, Take 1 tablet by mouth., Disp: , Rfl:     JANUVIA 100  MG tablet, Take 1 tablet by mouth Daily., Disp: , Rfl:     metFORMIN (GLUCOPHAGE) 1000 MG tablet, Take 1 tablet by mouth 2 (Two) Times a Day With Meals., Disp: , Rfl:     montelukast (SINGULAIR) 10 MG tablet, Take 1 tablet by mouth. Take 1 tablet (10 mg) by oral route once daily in the evening, Disp: , Rfl:     Multiple Vitamins-Minerals (OCUVITE ADULT 50+ PO), Take 1 tablet by mouth Daily., Disp: , Rfl:     olopatadine (PATANOL) 0.1 % ophthalmic solution, Daily., Disp: , Rfl:     Omega-3 Fatty Acids (OMEGA 3 PO), Take  by mouth Daily. Omega 3 350-400 mg oral capsule, Disp: , Rfl:     propranolol (INDERAL) 80 MG tablet, Take 1 tablet by mouth 2 (Two) Times a Day., Disp: , Rfl:     Saw Palmetto, Serenoa repens, (SAW PALMETTO PO), Take 450 mg by mouth Daily., Disp: , Rfl:     simvastatin (ZOCOR) 40 MG tablet, Take 1 tablet by mouth Every Night., Disp: , Rfl:     tamsulosin (FLOMAX) 0.4 MG capsule 24 hr capsule, Take 1 capsule by mouth Every Night for 90 days., Disp: 30 capsule, Rfl: 2    Turmeric 500 MG capsule, Take 1 capsule by mouth Daily., Disp: , Rfl:     zinc gluconate 50 MG tablet, Take  by mouth 2 (Two) Times a Day., Disp: , Rfl:     Past Medical History:   Diagnosis Date    Allergic rhinitis     Arthritis     Arthritis     Asthma     Basal cell carcinoma of skin of face     BCC (basal cell carcinoma)     nasal dorsum    Benign hypertrophy of prostate     Cataract     Clotting disorder ?    COPD (chronic obstructive pulmonary disease)     COPD (chronic obstructive pulmonary disease)     Coronary artery disease 1970    Dental disease 2015    Have had 2 root canals.    Diabetes     Diabetes mellitus     TYPE 2    Elevated cholesterol     Elevated PSA 1985?    Erectile dysfunction 2020?    Fracture of nasal bones     Headache     Have minor headaches daily. When the headache is debilitating is due to weather.    Hypertension     Hypertension     Nosebleed     Peripheral neuropathy     Postural dizziness      "Sinusitis     Have had 5 sinus operations.    Skin cancer of face     Sleep apnea in adult     Tremors of nervous system     \"essential tremors\"    Urinary incontinence        Past Surgical History:   Procedure Laterality Date    ARM LESION/CYST EXCISION Left 06/28/2021    Procedure: WIDE EXCISION SKIN LESION LEFT SHOULDER;  Surgeon: Marisol Conner MD;  Location:  [facility] OR;  Service: General;  Laterality: Left;    BASAL CELL CARCINOMA EXCISION  08/03/2014    BRAIN SURGERY      CATARACT EXTRACTION W/ INTRAOCULAR LENS  IMPLANT, BILATERAL Bilateral     CHOLECYSTECTOMY      COSMETIC SURGERY  2019    Had a Basel Cell Cancer on forehead removed.    EYE SURGERY      cataract    HEAD/NECK LESION/CYST EXCISION Left 09/26/2016    Procedure: EXCISION LESION left ear and left temple BASAL CELL W/ F/S;  Surgeon: Ashok Harden MD;  Location:  [facility] OR;  Service:     OTHER SURGICAL HISTORY      Shoulder    OTHER SURGICAL HISTORY      Thumb Surgery    SINUS SURGERY      SKIN LESION EXCISION Right 12/01/2022    Procedure: Excision of 3 Skin Cancers of the Right Cheek with Reconstruction;  Surgeon: Damien Titus MD;  Location:  [facility] OR;  Service: ENT;  Laterality: Right;    TOTAL SHOULDER ARTHROPLASTY Right     TRIGGER FINGER RELEASE      LEFT THUMB       Social History     Socioeconomic History    Marital status:    Tobacco Use    Smoking status: Never    Smokeless tobacco: Never    Tobacco comments:     As an athlete, 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   Vaping Use    Vaping status: Never Used   Substance and Sexual Activity    Alcohol use: Not Currently     Comment: Again, as an athlete, I drank a little in college.    Drug use: No    Sexual activity: Not Currently     Partners: Female       Family History   Problem Relation Age of Onset    Heart disease Father     Hypertension Father     Heart failure Father     Arthritis Mother     Cancer Sister        Objective    Temp 96.8 °F (36 °C)   Ht 184.2 cm (72.5\")   Wt 115 kg (254 " lb 9.6 oz)   BMI 34.06 kg/m²     Physical Exam        Results for orders placed or performed in visit on 05/29/25   POC Urinalysis Dipstick, Multipro    Collection Time: 05/29/25  9:09 AM    Specimen: Urine   Result Value Ref Range    Color Yellow Yellow, Straw, Dark Yellow, Brittney    Clarity, UA Clear Clear    Glucose, UA >=1000 mg/dL (3+) (A) Negative mg/dL    Bilirubin Negative Negative    Ketones, UA Negative Negative    Specific Gravity  1.015 1.005 - 1.030    Blood, UA Negative Negative    pH, Urine 7.0 5.0 - 8.0    Protein, POC Negative Negative mg/dL    Urobilinogen, UA Normal Normal, 0.2 E.U./dL    Nitrite, UA Negative Negative    Leukocytes Negative Negative   Estimation of residual urine via abdominal ultrasound  Residual Urine: 114 ml  Indication: BPH/Urge Incontinence & Hesistancy  Position: Supine  Examination: Incremental scanning of the suprapubic area using 3 MHz transducer using copious amounts of acoustic gel.   Findings: An anechoic area was demonstrated which represented the bladder, with measurement of residual urine as noted. I inspected this myself. In that the residual urine was stable or insignificant, no treatment will be necessary at this time.       Assessment and Plan    Diagnoses and all orders for this visit:    1. Urge incontinence (Primary)  -     POC Urinalysis Dipstick, Multipro    2. Benign prostatic hyperplasia with urinary hesitancy  -     POC Urinalysis Dipstick, Multipro      Patient symptoms have dramatically improved and essentially resolved on tamsulosin he is very pleased with the outcome.  He wants to continue the medication no refills needed today.  Patient can follow-up in 6 months

## 2025-05-12 ENCOUNTER — OFFICE VISIT (OUTPATIENT)
Age: 85
End: 2025-05-12
Payer: MEDICARE

## 2025-05-12 VITALS
HEIGHT: 73 IN | DIASTOLIC BLOOD PRESSURE: 78 MMHG | BODY MASS INDEX: 34.85 KG/M2 | SYSTOLIC BLOOD PRESSURE: 118 MMHG | WEIGHT: 263 LBS

## 2025-05-12 DIAGNOSIS — G47.33 OBSTRUCTIVE SLEEP APNEA: ICD-10-CM

## 2025-05-12 DIAGNOSIS — E78.5 DYSLIPIDEMIA: ICD-10-CM

## 2025-05-12 DIAGNOSIS — I25.10 CORONARY ARTERY DISEASE INVOLVING NATIVE CORONARY ARTERY OF NATIVE HEART WITHOUT ANGINA PECTORIS: Primary | ICD-10-CM

## 2025-05-12 DIAGNOSIS — I10 ESSENTIAL HYPERTENSION: ICD-10-CM

## 2025-05-12 DIAGNOSIS — E11.69 TYPE 2 DIABETES MELLITUS WITH OTHER SPECIFIED COMPLICATION, WITHOUT LONG-TERM CURRENT USE OF INSULIN (HCC): ICD-10-CM

## 2025-05-12 DIAGNOSIS — R06.02 SHORTNESS OF BREATH: ICD-10-CM

## 2025-05-12 DIAGNOSIS — G25.0 ESSENTIAL TREMOR: ICD-10-CM

## 2025-05-12 DIAGNOSIS — R01.1 HEART MURMUR: ICD-10-CM

## 2025-05-12 PROCEDURE — 3074F SYST BP LT 130 MM HG: CPT | Performed by: CLINICAL NURSE SPECIALIST

## 2025-05-12 PROCEDURE — G8417 CALC BMI ABV UP PARAM F/U: HCPCS | Performed by: CLINICAL NURSE SPECIALIST

## 2025-05-12 PROCEDURE — 93010 ELECTROCARDIOGRAM REPORT: CPT | Performed by: CLINICAL NURSE SPECIALIST

## 2025-05-12 PROCEDURE — 93005 ELECTROCARDIOGRAM TRACING: CPT | Performed by: CLINICAL NURSE SPECIALIST

## 2025-05-12 PROCEDURE — G8427 DOCREV CUR MEDS BY ELIG CLIN: HCPCS | Performed by: CLINICAL NURSE SPECIALIST

## 2025-05-12 PROCEDURE — 1123F ACP DISCUSS/DSCN MKR DOCD: CPT | Performed by: CLINICAL NURSE SPECIALIST

## 2025-05-12 PROCEDURE — 3078F DIAST BP <80 MM HG: CPT | Performed by: CLINICAL NURSE SPECIALIST

## 2025-05-12 PROCEDURE — 99214 OFFICE O/P EST MOD 30 MIN: CPT | Performed by: CLINICAL NURSE SPECIALIST

## 2025-05-12 PROCEDURE — 1159F MED LIST DOCD IN RCRD: CPT | Performed by: CLINICAL NURSE SPECIALIST

## 2025-05-12 RX ORDER — TAMSULOSIN HYDROCHLORIDE 0.4 MG/1
0.4 CAPSULE ORAL NIGHTLY
COMMUNITY
Start: 2025-04-30 | End: 2025-07-30

## 2025-05-12 RX ORDER — DAPAGLIFLOZIN 10 MG/1
10 TABLET, FILM COATED ORAL EVERY MORNING
COMMUNITY

## 2025-05-12 ASSESSMENT — ENCOUNTER SYMPTOMS
CHEST TIGHTNESS: 0
VOMITING: 0
WHEEZING: 0
ABDOMINAL PAIN: 0
COUGH: 0
SHORTNESS OF BREATH: 1
NAUSEA: 0
FACIAL SWELLING: 0
EYE REDNESS: 0

## 2025-05-12 NOTE — PROGRESS NOTES
[facility], [facility]  [address], [place] [address]  Phone: [phone]  Fax: [phone]    OFFICE VISIT:  2025    Tiot Capps - : 1940    Reason For Visit:  Tito is a 84 y.o. male who is here for Follow-up and Coronary artery disease involving native coronary artery of       Diagnosis Orders   1. Coronary artery disease involving native coronary artery of native heart without angina pectoris        2. Essential hypertension  EKG 12 lead      3. Dyslipidemia        4. Type 2 diabetes mellitus with other specified complication, without long-term current use of insulin (HCC)        5. Obstructive sleep apnea        6. Essential tremor        7. Shortness of breath  Echo (TTE) complete (PRN contrast/bubble/strain/3D)              HPI  Pt with a history of dyslipidemia, diabetes, hypertension, and coronary disease returns for a follow-up visit.  He underwent first angiographic assessment  with a 20% proximal LAD stenosis.  An abnormal dobutamine stress echo was obtained in 2021 with delayed angiographic assessment in February of this year at which time he was shown to have a 90% proximal to mid LAD stenosis, and a 30 to 40% lesion in his mid/distal right coronary.  The LAD stenosis was stented with a 2.5 x 15 mm drug-eluting stent at [facility], Dr Powers.        Nerve stimulator placed at [facility] for essential tremors in .  He reports this device has not been working properly and he has a consultation for possible replacement    Issues with incontinence, BPH and now following with urology.  Tamsulosin is helping.    Pt denies Chest pain, unusual dyspnea, orthopnea, PND, edema, palpitations.  His main complaint or issues with his tremors, balance and mobility       Ally Maddox, [facility] - [facility] is PCP.  Tito Capps [facility] the [facility]:    Patient Active Problem List    Diagnosis Date Noted    Essential

## 2025-05-16 ENCOUNTER — HOSPITAL ENCOUNTER (OUTPATIENT)
Age: 85
Discharge: HOME | End: 2025-05-18
Payer: MEDICARE

## 2025-05-16 VITALS
WEIGHT: 255 LBS | SYSTOLIC BLOOD PRESSURE: 129 MMHG | BODY MASS INDEX: 34.54 KG/M2 | HEIGHT: 72 IN | DIASTOLIC BLOOD PRESSURE: 76 MMHG

## 2025-05-16 DIAGNOSIS — R06.02 SHORTNESS OF BREATH: ICD-10-CM

## 2025-05-16 PROCEDURE — C8929 TTE W OR WO FOL WCON,DOPPLER: HCPCS

## 2025-05-16 PROCEDURE — 6360000004 HC RX CONTRAST MEDICATION: Performed by: CLINICAL NURSE SPECIALIST

## 2025-05-16 RX ADMIN — SULFUR HEXAFLUORIDE 2 ML: 60.7; .19; .19 INJECTION, POWDER, LYOPHILIZED, FOR SUSPENSION INTRAVENOUS; INTRAVESICAL at 10:21

## 2025-05-17 LAB
ECHO AO ASC DIAM: 3.4 CM
ECHO AO ASCENDING AORTA INDEX: 1.44 CM/M2
ECHO AO ROOT DIAM: 2.7 CM
ECHO AO ROOT INDEX: 1.14 CM/M2
ECHO AO SINUS VALSALVA DIAM: 3.4 CM
ECHO AO SINUS VALSALVA INDEX: 1.44 CM/M2
ECHO AO ST JNCT DIAM: 2.4 CM
ECHO AV AREA PEAK VELOCITY: 1.2 CM2
ECHO AV AREA VTI: 1.3 CM2
ECHO AV AREA/BSA PEAK VELOCITY: 0.5 CM2/M2
ECHO AV AREA/BSA VTI: 0.6 CM2/M2
ECHO AV MEAN GRADIENT: 19 MMHG
ECHO AV MEAN VELOCITY: 2.1 M/S
ECHO AV PEAK GRADIENT: 30 MMHG
ECHO AV PEAK VELOCITY: 2.7 M/S
ECHO AV VELOCITY RATIO: 0.3
ECHO AV VTI: 69.3 CM
ECHO BSA: 2.42 M2
ECHO EST RA PRESSURE: 3 MMHG
ECHO IVC PROX: 1.9 CM
ECHO LA AREA 2C: 27.5 CM2
ECHO LA AREA 4C: 27.7 CM2
ECHO LA DIAMETER INDEX: 1.74 CM/M2
ECHO LA DIAMETER: 4.1 CM
ECHO LA MAJOR AXIS: 6.9 CM
ECHO LA MINOR AXIS: 6.5 CM
ECHO LA TO AORTIC ROOT RATIO: 1.52
ECHO LA VOL BP: 100 ML (ref 18–58)
ECHO LA VOL MOD A2C: 101 ML (ref 18–58)
ECHO LA VOL MOD A4C: 95 ML (ref 18–58)
ECHO LA VOL/BSA BIPLANE: 42 ML/M2 (ref 16–34)
ECHO LA VOLUME INDEX MOD A2C: 43 ML/M2 (ref 16–34)
ECHO LA VOLUME INDEX MOD A4C: 40 ML/M2 (ref 16–34)
ECHO LV E' LATERAL VELOCITY: 4.46 CM/S
ECHO LV E' SEPTAL VELOCITY: 4.9 CM/S
ECHO LV EDV A2C: 73 ML
ECHO LV EDV A4C: 134 ML
ECHO LV EDV INDEX A4C: 57 ML/M2
ECHO LV EDV NDEX A2C: 31 ML/M2
ECHO LV EF PHYSICIAN: 58 %
ECHO LV EJECTION FRACTION A2C: 58 %
ECHO LV EJECTION FRACTION A4C: 58 %
ECHO LV EJECTION FRACTION BIPLANE: 58 % (ref 55–100)
ECHO LV ESV A2C: 31 ML
ECHO LV ESV A4C: 56 ML
ECHO LV ESV INDEX A2C: 13 ML/M2
ECHO LV ESV INDEX A4C: 24 ML/M2
ECHO LV FRACTIONAL SHORTENING: 32 % (ref 28–44)
ECHO LV INTERNAL DIMENSION DIASTOLE INDEX: 2.25 CM/M2
ECHO LV INTERNAL DIMENSION DIASTOLIC: 5.3 CM (ref 4.2–5.9)
ECHO LV INTERNAL DIMENSION SYSTOLIC INDEX: 1.53 CM/M2
ECHO LV INTERNAL DIMENSION SYSTOLIC: 3.6 CM
ECHO LV ISOVOLUMETRIC RELAXATION TIME (IVRT): 99 MS
ECHO LV IVSD: 1.1 CM (ref 0.6–1)
ECHO LV MASS 2D: 227.7 G (ref 88–224)
ECHO LV MASS INDEX 2D: 96.5 G/M2 (ref 49–115)
ECHO LV POSTERIOR WALL DIASTOLIC: 1.1 CM (ref 0.6–1)
ECHO LV RELATIVE WALL THICKNESS RATIO: 0.42
ECHO LVOT AREA: 3.8 CM2
ECHO LVOT AV VTI INDEX: 0.35
ECHO LVOT DIAM: 2.2 CM
ECHO LVOT MEAN GRADIENT: 2 MMHG
ECHO LVOT MEAN GRADIENT: 2 MMHG
ECHO LVOT PEAK GRADIENT: 2 MMHG
ECHO LVOT PEAK VELOCITY: 0.8 M/S
ECHO LVOT STROKE VOLUME INDEX: 39 ML/M2
ECHO LVOT SV: 91.9 ML
ECHO LVOT VTI: 24.2 CM
ECHO MV A VELOCITY: 0.66 M/S
ECHO MV ANNULUS DIAMETER: 3.4 CM
ECHO MV AREA VTI: 4.2 CM2
ECHO MV E DECELERATION TIME (DT): 372 MS
ECHO MV E VELOCITY: 0.72 M/S
ECHO MV E/A RATIO: 1.09
ECHO MV E/E' LATERAL: 16.14
ECHO MV E/E' RATIO (AVERAGED): 15.42
ECHO MV E/E' SEPTAL: 14.69
ECHO MV LVOT VTI INDEX: 0.91
ECHO MV MAX VELOCITY: 0.7 M/S
ECHO MV MEAN GRADIENT: 1 MMHG
ECHO MV MEAN VELOCITY: 0.4 M/S
ECHO MV PEAK GRADIENT: 2 MMHG
ECHO MV VTI: 22 CM
ECHO RA AREA 4C: 12.5 CM2
ECHO RA END SYSTOLIC VOLUME APICAL 4 CHAMBER INDEX BSA: 11 ML/M2
ECHO RA MAJOR AXIS INDEX: 1.99 CM/M2
ECHO RA MAJOR AXIS: 4.7 CM
ECHO RA MINOR AXIS INDEX: 1.23 CM/M2
ECHO RA MINOR AXIS: 2.9 CM
ECHO RA VOLUME: 26 ML
ECHO RV BASAL DIMENSION: 1.8 CM
ECHO RV INTERNAL DIMENSION: 5 CM
ECHO RV LONGITUDINAL DIMENSION: 9.6 CM
ECHO RV MID DIMENSION: 3.7 CM
ECHO RV TAPSE: 2.5 CM (ref 1.7–?)

## 2025-05-17 PROCEDURE — 93306 TTE W/DOPPLER COMPLETE: CPT | Performed by: INTERNAL MEDICINE

## 2025-05-19 ENCOUNTER — RESULTS FOLLOW-UP (OUTPATIENT)
Dept: CARDIOLOGY CLINIC | Age: 85
End: 2025-05-19

## 2025-05-23 ENCOUNTER — TELEPHONE (OUTPATIENT)
Dept: CARDIOLOGY CLINIC | Age: 85
End: 2025-05-23

## 2025-05-23 NOTE — TELEPHONE ENCOUNTER
Date: 7/22, 7/29, 8/5     Cardiologist: only NP since [place]     Procedure: deep brain stimulator implant for Parkinsons     Surgeon: [facility]     Last Office Visit: 8/12/24     Reason for office visit and medical concerns addressed at this office visit: CAD, HTN, hyperlipidemia, DM     Testing Performed and Date of Service:  5/16/25 Echo   Left Ventricle: Normal left ventricular systolic function with a visually estimated EF of 55 - 60%. EF by visual approximation is 58%. Left ventricle size is normal. Mildly increased wall thickness. Normal wall motion. Grade II diastolic dysfunction with increased LAP.    Right Ventricle: Right ventricle size is normal. Normal systolic function.    Aortic Valve: Trileaflet valve. Moderate sclerosis of the aortic valve cusps. Mild stenosis of the aortic valve. AV mean gradient is 19 mmHg. AV area by continuity VTI is 1.3 cm2.    Left Atrium: Left atrium is mild to moderately dilated.    Image quality is suboptimal. Contrast used: Lumason. Technically difficult study.        Does the patient have a stent? If so, what type?  CINDY 2/25/22     Current Medications: lozol, probiotic, vit C, calcitrate, cinnamon, coconut oil, mg, plavix, ASA, celexa, pulmicort, mysoline, metformin, zocor, inderal, singulair, januvia      Is the patient currently taking an anticoagulant? If so, what is the diagnosis the patient has been given to warrant the need for the anticoagulant? Plavix and ASA     Additional Notes: requesting to hold plavix and aspirin from 7/15/25-8/8/25

## 2025-05-27 NOTE — TELEPHONE ENCOUNTER
They want to hold for 3 weeks as it's a 3 stage procedure, he will be having, a procedure once a week for a total of 3 weeks and would need to hold meds the entire time.

## 2025-05-27 NOTE — TELEPHONE ENCOUNTER
I would question the request to hold the aspirin and Plavix for 3 weeks?  It would be out of his system in 5 to 7 days and with his cardiac history, would recommend minimizing the time that he is off these medications.  Is there a reason that he cannot hold for just 5 to 7 days?

## 2025-05-29 ENCOUNTER — OFFICE VISIT (OUTPATIENT)
Dept: UROLOGY | Facility: CLINIC | Age: 85
End: 2025-05-29
Payer: MEDICARE

## 2025-05-29 VITALS — BODY MASS INDEX: 33.74 KG/M2 | HEIGHT: 73 IN | TEMPERATURE: 96.8 F | WEIGHT: 254.6 LBS

## 2025-05-29 DIAGNOSIS — N39.41 URGE INCONTINENCE: Primary | ICD-10-CM

## 2025-05-29 DIAGNOSIS — N40.1 BENIGN PROSTATIC HYPERPLASIA WITH URINARY HESITANCY: ICD-10-CM

## 2025-05-29 DIAGNOSIS — R39.11 BENIGN PROSTATIC HYPERPLASIA WITH URINARY HESITANCY: ICD-10-CM

## 2025-05-29 LAB
BILIRUB BLD-MCNC: NEGATIVE MG/DL
CLARITY, POC: CLEAR
COLOR UR: YELLOW
GLUCOSE UR STRIP-MCNC: ABNORMAL MG/DL
KETONES UR QL: NEGATIVE
LEUKOCYTE EST, POC: NEGATIVE
NITRITE UR-MCNC: NEGATIVE MG/ML
PH UR: 7 [PH] (ref 5–8)
PROT UR STRIP-MCNC: NEGATIVE MG/DL
RBC # UR STRIP: NEGATIVE /UL
SP GR UR: 1.01 (ref 1–1.03)
UROBILINOGEN UR QL: NORMAL

## 2025-07-19 DIAGNOSIS — R39.11 BENIGN PROSTATIC HYPERPLASIA WITH URINARY HESITANCY: ICD-10-CM

## 2025-07-19 DIAGNOSIS — N40.1 BENIGN PROSTATIC HYPERPLASIA WITH URINARY HESITANCY: ICD-10-CM

## 2025-07-21 RX ORDER — TAMSULOSIN HYDROCHLORIDE 0.4 MG/1
CAPSULE ORAL
Qty: 30 CAPSULE | Refills: 0 | Status: SHIPPED | OUTPATIENT
Start: 2025-07-21

## 2025-08-28 DIAGNOSIS — N40.1 BENIGN PROSTATIC HYPERPLASIA WITH URINARY HESITANCY: ICD-10-CM

## 2025-08-28 DIAGNOSIS — R39.11 BENIGN PROSTATIC HYPERPLASIA WITH URINARY HESITANCY: ICD-10-CM

## 2025-08-28 RX ORDER — TAMSULOSIN HYDROCHLORIDE 0.4 MG/1
1 CAPSULE ORAL NIGHTLY
Qty: 30 CAPSULE | Refills: 0 | Status: SHIPPED | OUTPATIENT
Start: 2025-08-28

## (undated) DEVICE — CABL BIPOL MEGADYNE 12FT DISP

## (undated) DEVICE — PREP SOL POVIDONE/IODINE BT 4OZ

## (undated) DEVICE — PK TURNOVER RM ADV

## (undated) DEVICE — MARKR SKIN W/RULR AND LBL

## (undated) DEVICE — GLV SURG BIOGEL M LTX PF 7 1/2

## (undated) DEVICE — SPNG GZ WOVN 4X4IN 12PLY 10/BX STRL

## (undated) DEVICE — GLV SURG BIOGEL LTX PF 8

## (undated) DEVICE — PK ENT HD AND NK 30

## (undated) DEVICE — SUT SILK 2/0 SH 30IN K833H

## (undated) DEVICE — PAD MINOR UNIVERSAL: Brand: MEDLINE INDUSTRIES, INC.

## (undated) DEVICE — STRIP,CLOSURE,WOUND,MEDI-STRIP,1/2X4: Brand: MEDLINE

## (undated) DEVICE — APPL CHLORAPREP HI/LITE 26ML ORNG

## (undated) DEVICE — SUT SILK 2/0 SUTUPAK TIES 24IN SA75H

## (undated) DEVICE — BAPTIST TURNOVER KIT: Brand: MEDLINE INDUSTRIES, INC.

## (undated) DEVICE — ELECTRD NDL EZ CLN MOD 2.75IN